# Patient Record
Sex: FEMALE | Race: WHITE | NOT HISPANIC OR LATINO | Employment: OTHER | URBAN - METROPOLITAN AREA
[De-identification: names, ages, dates, MRNs, and addresses within clinical notes are randomized per-mention and may not be internally consistent; named-entity substitution may affect disease eponyms.]

---

## 2017-03-16 ENCOUNTER — HOSPITAL ENCOUNTER (OUTPATIENT)
Dept: RADIOLOGY | Facility: MEDICAL CENTER | Age: 62
End: 2017-03-16
Attending: FAMILY MEDICINE
Payer: COMMERCIAL

## 2017-03-16 ENCOUNTER — OFFICE VISIT (OUTPATIENT)
Dept: MEDICAL GROUP | Age: 62
End: 2017-03-16
Payer: COMMERCIAL

## 2017-03-16 ENCOUNTER — TELEPHONE (OUTPATIENT)
Dept: MEDICAL GROUP | Age: 62
End: 2017-03-16

## 2017-03-16 VITALS
OXYGEN SATURATION: 100 % | WEIGHT: 154 LBS | SYSTOLIC BLOOD PRESSURE: 116 MMHG | DIASTOLIC BLOOD PRESSURE: 62 MMHG | TEMPERATURE: 98.7 F | HEART RATE: 107 BPM | BODY MASS INDEX: 28.34 KG/M2 | HEIGHT: 62 IN

## 2017-03-16 DIAGNOSIS — S49.91XA RIGHT SHOULDER INJURY, INITIAL ENCOUNTER: ICD-10-CM

## 2017-03-16 DIAGNOSIS — R06.83 SNORING: ICD-10-CM

## 2017-03-16 DIAGNOSIS — R09.82 POSTNASAL DRIP: ICD-10-CM

## 2017-03-16 DIAGNOSIS — R06.2 WHEEZING: ICD-10-CM

## 2017-03-16 DIAGNOSIS — R05.9 COUGH: ICD-10-CM

## 2017-03-16 PROCEDURE — 73030 X-RAY EXAM OF SHOULDER: CPT | Mod: RT

## 2017-03-16 PROCEDURE — 99214 OFFICE O/P EST MOD 30 MIN: CPT | Performed by: FAMILY MEDICINE

## 2017-03-16 RX ORDER — FLUTICASONE PROPIONATE 50 MCG
2 SPRAY, SUSPENSION (ML) NASAL DAILY
Qty: 16 G | Refills: 3 | Status: SHIPPED | OUTPATIENT
Start: 2017-03-16 | End: 2017-09-19

## 2017-03-16 RX ORDER — ALBUTEROL SULFATE 90 UG/1
2 AEROSOL, METERED RESPIRATORY (INHALATION) EVERY 6 HOURS PRN
Qty: 8.5 G | Refills: 1 | Status: SHIPPED | OUTPATIENT
Start: 2017-03-16 | End: 2017-09-19

## 2017-03-16 ASSESSMENT — PATIENT HEALTH QUESTIONNAIRE - PHQ9: CLINICAL INTERPRETATION OF PHQ2 SCORE: 0

## 2017-03-16 NOTE — MR AVS SNAPSHOT
"Cindi Cooper   3/16/2017 8:20 AM   Office Visit   MRN: [id number]    Department:  [address] [place] [address]   Dept Phone:  [phone]    Description:  Female : 1955   Provider:  Haley Jin M.D.           Reason for Visit     Shoulder Injury           Allergies as of 3/16/2017     No Known Allergies      You were diagnosed with     Right shoulder injury, initial encounter   [813619]       Cough   [786.2.ICD-9-CM]       Postnasal drip   [784.91.ICD-9-CM]       Wheezing   [786.07.ICD-9-CM]         Vital Signs     Blood Pressure Pulse Temperature Height Weight Body Mass Index    116/62 mmHg 107 37.1 °C (98.7 °F) 1.575 m (5' 2\") 69.854 kg (154 lb) 28.16 kg/m2    Oxygen Saturation Smoking Status                100% Former Smoker          Basic Information     Date Of Birth Sex Race Ethnicity Preferred Language    1955 Female White Non- English      Your appointments     May 03, 2017  1:00 PM   Established Patient with Haley Jin M.D.   [address] [id number] [phone]           You will be receiving a confirmation call a few days before your appointment from our automated call confirmation system.              Problem List              ICD-10-CM Priority Class Noted - Resolved    Hypercholesteremia E78.00   2015 - Present    Vitamin D insufficiency E55.9   3/30/2016 - Present      Health Maintenance        Date Due Completion Dates    IMM DTaP/Tdap/Td Vaccine (1 - Tdap) 3/7/1976 ---    COLONOSCOPY 3/20/2014 3/20/2009    IMM INFLUENZA (1) 2016 ---    IMM ZOSTER VACCINE 3/7/2017 ---    MAMMOGRAM 2017, 2015, 2014, 2013, 2012, 2011, 3/15/2010, 3/15/2010, 2008, 2008, 10/31/2007, 10/31/2007, 10/24/2006, 10/24/2006, 2005, 2004    PAP SMEAR 2018            Current Immunizations     No immunizations on file.      Below and/or attached are the " medications your provider expects you to take. Review all of your home medications and newly ordered medications with your provider and/or pharmacist. Follow medication instructions as directed by your provider and/or pharmacist. Please keep your medication list with you and share with your provider. Update the information when medications are discontinued, doses are changed, or new medications (including over-the-counter products) are added; and carry medication information at all times in the event of emergency situations     Allergies:  No Known Allergies          Medications  Valid as of: March 16, 2017 -  9:19 AM    Generic Name Brand Name Tablet Size Instructions for use    Albuterol Sulfate (Aero Soln) albuterol 108 (90 BASE) MCG/ACT Inhale 2 Puffs by mouth every 6 hours as needed for Shortness of Breath.        Fluticasone Propionate (Suspension) FLONASE 50 MCG/ACT Spray 2 Sprays in nose every day.        Simvastatin (Tab) ZOCOR 20 MG TAKE 1 TAB BY MOUTH EVERY EVENING.        .                 Medicines prescribed today were sent to:     [facility]/PHARMACY #[id number] - [place] - [address]    [address] [place] [place] [address]    Phone: [phone] Fax: [phone]    Open 24 Hours?: No      Medication refill instructions:       If your prescription bottle indicates you have medication refills left, it is not necessary to call your provider’s office. Please contact your pharmacy and they will refill your medication.    If your prescription bottle indicates you do not have any refills left, you may request refills at any time through one of the following ways: The online Crowdery system (except Urgent Care), by calling your provider’s office, or by asking your pharmacy to contact your provider’s office with a refill request. Medication refills are processed only during regular business hours and may not be available until the next business day. Your provider may request additional information or to have a  follow-up visit with you prior to refilling your medication.   *Please Note: Medication refills are assigned a new Rx number when refilled electronically. Your pharmacy may indicate that no refills were authorized even though a new prescription for the same medication is available at the pharmacy. Please request the medicine by name with the pharmacy before contacting your provider for a refill.        Your To Do List     Future Labs/Procedures Complete By Expires    DX-SHOULDER 2+ RIGHT  As directed 9/13/2017         [email or web address] Access Code: [email or web address]  Expires: 4/15/2017  8:16 AM    Reval.com  A secure, online tool to manage your health information     [employer]’s Reval.com® is a secure, online tool that connects you to your personalized health information from the privacy of your home -- day or night - making it very easy for you to manage your healthcare. Once the activation process is completed, you can even access your medical information using the Reval.com dahlia, which is available for free in the Apple Dahlia store or Google Play store.     Reval.com provides the following levels of access (as shown below):   My Chart Features   [facility] Primary [facility] Doctor [facility]  Specialists [facility]  Urgent  Care Non-[facility]  Primary Care  Doctor   Email your healthcare team securely and privately 24/7 X X X    Manage appointments: schedule your next appointment; view details of past/upcoming appointments X      Request prescription refills. X      View recent personal medical records, including lab and immunizations X X X X   View health record, including health history, allergies, medications X X X X   Read reports about your outpatient visits, procedures, consult and ER notes X X X X   See your discharge summary, which is a recap of your hospital and/or ER visit that includes your diagnosis, lab results, and care plan. X X       How to register for [email or web address].com:  1. Go to  [email or web address].  2. Click on the Sign Up Now box,  which takes you to the New Member Sign Up page. You will need to provide the following information:  a. Enter your Groov[employer]ds Access Code exactly as it appears at the top of this page. (You will not need to use this code after you’ve completed the sign-up process. If you do not sign up before the expiration date, you must request a new code.)   b. Enter your date of birth.   c. Enter your home email address.   d. Click Submit, and follow the next screen’s instructions.  3. Create a GroovinAds ID. This will be your GroovinAds login ID and cannot be changed, so think of one that is secure and easy to remember.  4. Create a Groov[employer]Ads password. You can change your password at any time.  5. Enter your Password Reset Question and Answer. This can be used at a later time if you forget your password.   6. Enter your e-mail address. This allows you to receive e-mail notifications when new information is available in GroovinAds.  7. Click Sign Up. You can now view your health information.    For assistance activating your GroovinAds account, call [phone]

## 2017-03-16 NOTE — PROGRESS NOTES
"SUBJECTIVE:        Chief Complaint   Patient presents with   • Shoulder Injury       HPI:     Cnidi Cooper is a 62 y.o. female here for shoulder pain on right after having a fall. Right handed.   Dec 2016, was jogging and tripped and fell hard on right side of her right arm. Uncertain how she fell exactly as it happened so fast, but possibly landed on her forearm. Hurts mostly at night time in her rights shoulder region. If moves a certain way it hurts. Avoiding weights now.   Right anterior shoulder hurts mostly. At rest hurts- annoying pain, 6/10. Achy, sharp and stabbing pain.   Ibuprofen has not helped much.  Has not taken much medication. Does not radiate. No neck pain.   Works as , at desk and computer. No numbness, tingling or weakness.     Dry cough last few day, has had some postnasal drainage. No significant congestion. No fevers/chills.     Has had holter monitor previously low HR. Denies any symptoms of dizziness and light headedness.   Does snore at night. Feels exhausted when gets home from work lately.    ROS:  Denies any recent fevers or chills. No nausea or vomiting. No diarrhea. No chest pains or shortness of breath. No lower extremity edema.    Current Outpatient Prescriptions on File Prior to Visit   Medication Sig Dispense Refill   • simvastatin (ZOCOR) 20 MG Tab TAKE 1 TAB BY MOUTH EVERY EVENING. 90 Tab 2     No current facility-administered medications on file prior to visit.       No Known Allergies    Past Medical History   Diagnosis Date   • Hyperlipidemia        OBJECTIVE:   /62 mmHg  Pulse 107  Temp(Src) 37.1 °C (98.7 °F)  Ht 1.575 m (5' 2\")  Wt 69.854 kg (154 lb)  BMI 28.16 kg/m2  SpO2 100%  General: Well-developed well-nourished female, no acute distress  HEENT: oropharynx clear, eyes clear, TMs clear and intact  Neck: supple, no lymphadenopathy- cervical or supraclavicular, no thyromegaly, nasal passages with mild edema  Cardiovascular: regular rate and " rhythm, no murmurs, gallops, rubs  Lungs: mild wheezes throughout,  no crackles, or rhonchi  Abdomen: +bowel sounds, soft, nontender, nondistended, no rebound, no guarding, no hepatosplenomegaly  Extremities: no cyanosis, clubbing, edema  Neck exam: No spinal tenderness to palpation. Full ROM.   Shoulder/arm exam: No erythema/edema/ecchymosis. Tenderness to palpation - right anterior shoulder with TTP and along biceps groove. Acromioclavicular joint tenderness to palpation: negative, slight step off of right AC joint region compared to left.  ROM- forward flexion R 165 deg L 165 deg, glenohumeral joint R 90 deg  L 90 deg, external rotation R 70 deg L 70 deg, internal rotation R thumb L 4.  Hawkin's test negative. Neer's test-negative. Weatherford's test + on right. Yergason's test negative. Speed's test  + on right. Cross arm test negative. Empty can test - mild discomfort with resistance. Drop arm test negative. Lift off - mild discomfort with resistance. 5/5 strength bilaterally.   Elbow/forearm: Tenderness to palpation: negative. Full ROM negative.  5/5 strength throughout bilaterally. 2+ DTR biceps and triceps bilaterally.   Skin: Warm and dry  Psych: appropriate mood and affect      ASSESSMENT/PLAN:    62 year old female.     1. Right shoulder injury, initial encounter- slight irregularly of right AC joint, nontender. Suspect possible labral tear, some component of rotator cuff and biceps tendon involvement. Suspect will need MRI-arthrogram for further evaluation.   - Modify activities. Declined medication therapy. Discussed possible physical therapy and alternative options.   DX-SHOULDER 2+ RIGHT   2. Cough- appears multifactorial.   fluticasone (FLONASE) 50 MCG/ACT nasal spray   3. Postnasal drip  fluticasone (FLONASE) 50 MCG/ACT nasal spray   4. Wheezing  Albuterol HFA   5. Snoring- hx of bradycardia, asymptomatic. Obtain overnight oximetry test.        Return in about 1 month (around  4/16/2017).

## 2017-03-16 NOTE — TELEPHONE ENCOUNTER
Please let pt know her shoulder xray was normal.   I will put in order for MRI as discussed. She will likely need lab work prior.

## 2017-03-17 NOTE — TELEPHONE ENCOUNTER
Phone Number Called: [phone]    Message: Spoke with pt, informed her of Dr. Jin's message as stated below, she acknowledged.    Left Message for patient to call back: N\A

## 2017-03-20 ENCOUNTER — TELEPHONE (OUTPATIENT)
Dept: MEDICAL GROUP | Age: 62
End: 2017-03-20

## 2017-03-20 NOTE — TELEPHONE ENCOUNTER
----- Message from Haley Jin M.D. sent at 3/20/2017  1:52 PM PDT -----  Please let patient know recent shoulder xray was normal.

## 2017-03-20 NOTE — TELEPHONE ENCOUNTER
Phone Number Called: [phone] (home)     Message: Left VM for patient to call back    Left Message for patient to call back: yes

## 2017-03-27 ENCOUNTER — TELEPHONE (OUTPATIENT)
Dept: MEDICAL GROUP | Age: 62
End: 2017-03-27

## 2017-03-27 NOTE — TELEPHONE ENCOUNTER
Imaging called stating that an MRI- Shoulder was cancelled by the radiologist because the pt needs an Arthrogram test done instead. They need this ordered to be placed so that they can reschedule the patient. Dr. Jin is out of the office until Wednesday.

## 2017-04-19 ENCOUNTER — HOSPITAL ENCOUNTER (OUTPATIENT)
Dept: RADIOLOGY | Facility: MEDICAL CENTER | Age: 62
End: 2017-04-19
Attending: FAMILY MEDICINE
Payer: COMMERCIAL

## 2017-04-19 DIAGNOSIS — S49.91XA RIGHT SHOULDER INJURY, INITIAL ENCOUNTER: ICD-10-CM

## 2017-04-19 PROCEDURE — 73222 MRI JOINT UPR EXTREM W/DYE: CPT | Mod: RT

## 2017-04-19 PROCEDURE — 700101 HCHG RX REV CODE 250

## 2017-04-19 PROCEDURE — 23350 INJECTION FOR SHOULDER X-RAY: CPT | Mod: RT

## 2017-04-19 PROCEDURE — A9579 GAD-BASE MR CONTRAST NOS,1ML: HCPCS | Performed by: FAMILY MEDICINE

## 2017-04-19 PROCEDURE — 700117 HCHG RX CONTRAST REV CODE 255: Performed by: FAMILY MEDICINE

## 2017-04-19 RX ORDER — LIDOCAINE HYDROCHLORIDE 10 MG/ML
INJECTION, SOLUTION INFILTRATION; PERINEURAL
Status: DISPENSED
Start: 2017-04-19 | End: 2017-04-20

## 2017-04-19 RX ADMIN — IOHEXOL 50 ML: 300 INJECTION, SOLUTION INTRAVENOUS at 13:35

## 2017-04-19 RX ADMIN — GADODIAMIDE 5 ML: 287 INJECTION INTRAVENOUS at 13:35

## 2017-04-19 NOTE — OR SURGEON
Immediate Post- Operative Note        PostOp Diagnosis: right shoulder pain      Procedure(s): right shoulder injection      Estimated Blood Loss: Less than 5 ml        Complications: None            4/19/2017     2:36 PM     Robb Montero

## 2017-05-03 ENCOUNTER — OFFICE VISIT (OUTPATIENT)
Dept: MEDICAL GROUP | Age: 62
End: 2017-05-03
Payer: COMMERCIAL

## 2017-05-03 VITALS
HEART RATE: 83 BPM | TEMPERATURE: 98.1 F | DIASTOLIC BLOOD PRESSURE: 78 MMHG | HEIGHT: 62 IN | SYSTOLIC BLOOD PRESSURE: 102 MMHG | WEIGHT: 154.6 LBS | OXYGEN SATURATION: 100 % | BODY MASS INDEX: 28.45 KG/M2

## 2017-05-03 DIAGNOSIS — R06.83 SNORING: ICD-10-CM

## 2017-05-03 DIAGNOSIS — M75.101 TEAR OF RIGHT ROTATOR CUFF, UNSPECIFIED TEAR EXTENT: ICD-10-CM

## 2017-05-03 DIAGNOSIS — Z12.11 SCREEN FOR COLON CANCER: ICD-10-CM

## 2017-05-03 PROCEDURE — 99214 OFFICE O/P EST MOD 30 MIN: CPT | Performed by: FAMILY MEDICINE

## 2017-05-03 NOTE — MR AVS SNAPSHOT
"        Cindi Cooper   5/3/2017 1:00 PM   Office Visit   MRN: [id number]    Department:  [address] [place] [address]   Dept Phone:  [phone]    Description:  Female : 1955   Provider:  Haley Jin M.D.           Reason for Visit     Shoulder Injury Right shoulder. Xray and MRI done      Allergies as of 5/3/2017     No Known Allergies      You were diagnosed with     Partial tear of right rotator cuff   [8984700]       Screen for colon cancer   [985071]         Vital Signs     Blood Pressure Pulse Temperature Height Weight Body Mass Index    102/78 mmHg 83 36.7 °C (98.1 °F) 1.575 m (5' 2.01\") 70.126 kg (154 lb 9.6 oz) 28.27 kg/m2    Oxygen Saturation Smoking Status                100% Former Smoker          Basic Information     Date Of Birth Sex Race Ethnicity Preferred Language    1955 Female White Non- English      Problem List              ICD-10-CM Priority Class Noted - Resolved    Hypercholesteremia E78.00   2015 - Present    Vitamin D insufficiency E55.9   3/30/2016 - Present      Health Maintenance        Date Due Completion Dates    IMM DTaP/Tdap/Td Vaccine (1 - Tdap) 3/7/1976 ---    COLONOSCOPY 3/20/2014 3/20/2009    IMM ZOSTER VACCINE 3/7/2017 ---    MAMMOGRAM 2017, 2015, 2014, 2013, 2012, 2011, 3/15/2010, 3/15/2010, 2008, 2008, 10/31/2007, 10/31/2007, 10/24/2006, 10/24/2006, 2005, 2004    PAP SMEAR 2018            Current Immunizations     No immunizations on file.      Below and/or attached are the medications your provider expects you to take. Review all of your home medications and newly ordered medications with your provider and/or pharmacist. Follow medication instructions as directed by your provider and/or pharmacist. Please keep your medication list with you and share with your provider. Update the information when medications are discontinued, doses are changed, or new medications (including " over-the-counter products) are added; and carry medication information at all times in the event of emergency situations     Allergies:  No Known Allergies          Medications  Valid as of: May 03, 2017 -  1:51 PM    Generic Name Brand Name Tablet Size Instructions for use    Albuterol Sulfate (Aero Soln) albuterol 108 (90 BASE) MCG/ACT Inhale 2 Puffs by mouth every 6 hours as needed for Shortness of Breath.        Fluticasone Propionate (Suspension) FLONASE 50 MCG/ACT Spray 2 Sprays in nose every day.        Simvastatin (Tab) ZOCOR 20 MG TAKE 1 TAB BY MOUTH EVERY EVENING.        .                 Medicines prescribed today were sent to:     [facility]/PHARMACY #[id number] - [place], [place] - [address]    [address] [place] [address]    Phone: [phone] Fax: [phone]    Open 24 Hours?: No      Medication refill instructions:       If your prescription bottle indicates you have medication refills left, it is not necessary to call your provider’s office. Please contact your pharmacy and they will refill your medication.    If your prescription bottle indicates you do not have any refills left, you may request refills at any time through one of the following ways: The online ReviewZAP system (except Urgent Care), by calling your provider’s office, or by asking your pharmacy to contact your provider’s office with a refill request. Medication refills are processed only during regular business hours and may not be available until the next business day. Your provider may request additional information or to have a follow-up visit with you prior to refilling your medication.   *Please Note: Medication refills are assigned a new Rx number when refilled electronically. Your pharmacy may indicate that no refills were authorized even though a new prescription for the same medication is available at the pharmacy. Please request the medicine by name with the pharmacy before contacting your provider for a refill.           Referral     A referral request has been sent to our patient care coordination department. Please allow 3-5 business days for us to process this request and contact you either by phone or mail. If you do not hear from us by the 5th business day, please call us at [phone].           [employer] Access Code: Activation code not generated  Current M[employer]Point Status: Active

## 2017-05-03 NOTE — PROGRESS NOTES
SUBJECTIVE:        Chief Complaint   Patient presents with   • Shoulder Injury     Right shoulder. Xray and MRI done       HPI:     Cindi Cooper is a 62 y.o. female here for follow up of right shoulder pain and imaging with MRI.   Right shoulder pain- since she fell on her right shoulder in December. Continues to have pain in the area. Has gotten worse since then.     Snoring- has not obtained overnight oximetry test yet. Did receive a call from [address].     Prior symptoms of cough, postnasal drip and wheezing improved after last visit.         Narrative        4/19/2017 1:18 PM    HISTORY/REASON FOR EXAM: Right shoulder pain. Ground-level fall.      TECHNIQUE/EXAM DESCRIPTION:  MRI shoulder RIGHT arthrogram.    Using a GE 1.5 Linda MRI scanner, T1 fat-suppressed oblique coronal, sagittal, axial and abduction external rotation views were obtained. Fast spin-echo T2 fat-suppressed oblique coronal, axial, and sagittal images were also obtained. These images were   obtained following the intra-articular administration of dilute Omniscan.    COMPARISON:  None.    FINDINGS:  Osseous acromial outlet: The acromion demonstrates a flat undersurface.  There is mild lateral downsloping.  There is a large inferiorly directed subacromial enthesophyte.  There is mild to moderate degenerative change of the acromioclavicular joint. There are no inferiorly directed osteophytes.  There is contrast signal fluid seen within the subacromial/subdeltoid bursa.    Rotator cuff: There is a full-thickness tear involving the leading edge of the supraspinatus tendon which measures 10 mm in AP dimension and results in minimal retraction. There is partial-thickness tear involving the posterior articular surface fibers.   There is tendinopathy involving the supraspinatus and infraspinatus tendons.    Glenoid labrum: The superior glenoid labrum is intact. The anterior/inferior and posterior/inferior glenoid labrum are intact. There is no  evidence of retraction of the anterior/inferior glenoid labrum on abduction external rotation images.    Osseous structures/Cartilaginous surfaces: There is no evidence of marrow edema or evidence of fracture. Cartilaginous surfaces are well-maintained. There is no evidence of a Hill--Sachs type of deformity.    Miscellaneous: No evidence of intra-articular loose body. The long head of the biceps tendon is appropriately situated within the bicipital groove.         Impression        1.  Full-thickness tear involving the leading edge of the supraspinatus tendon which measures 10 mm in AP dimension and results in minimal retraction. Partial-thickness tear involves the posterior articular surface fibers.    2.  Tendinopathy of the supraspinatus and infraspinatus tendons.    3.  Lateral downsloping of the acromion with a large inferiorly directed subacromial enthesophyte.    4.  Degenerative change of the acromioclavicular joint.    5.  Intact glenoid labrum.     Narrative        3/16/2017 9:32 AM    HISTORY/REASON FOR EXAM:  Fall onto right shoulder and right shoulder pain      TECHNIQUE/EXAM DESCRIPTION AND NUMBER OF VIEWS:  3 views of the RIGHT shoulder.    COMPARISON: None    FINDINGS:  Bone mineralization is normal.  There is no evidence of acute fracture.  There is no evidence of dislocation.  There is no evidence of arthropathy.  No abnormalities are identified in the soft tissues.         Impression        There is no evidence of acute fracture.       ROS:  Denies any recent fevers or chills. No nausea or vomiting. No diarrhea. No chest pains or shortness of breath. No lower extremity edema.    Current Outpatient Prescriptions on File Prior to Visit   Medication Sig Dispense Refill   • fluticasone (FLONASE) 50 MCG/ACT nasal spray Spray 2 Sprays in nose every day. 16 g 3   • simvastatin (ZOCOR) 20 MG Tab TAKE 1 TAB BY MOUTH EVERY EVENING. 90 Tab 2   • albuterol 108 (90 BASE) MCG/ACT Aero Soln inhalation aerosol  "Inhale 2 Puffs by mouth every 6 hours as needed for Shortness of Breath. (Patient not taking: Reported on 5/3/2017) 8.5 g 1     No current facility-administered medications on file prior to visit.       No Known Allergies    Past Medical History   Diagnosis Date   • Hyperlipidemia        OBJECTIVE:   /78 mmHg  Pulse 83  Temp(Src) 36.7 °C (98.1 °F)  Ht 1.575 m (5' 2.01\")  Wt 70.126 kg (154 lb 9.6 oz)  BMI 28.27 kg/m2  SpO2 100%  General: Well-developed well-nourished female, no acute distress  Oropharynx: large soft palate and small posterior oropharynx opening  Extremities: no cyanosis, clubbing, edema  Skin: Warm and dry  Psych: appropriate mood and affect      ASSESSMENT/PLAN:    62 year old female.     1. Tear of right rotator cuff, unspecified tear extent- full thickness and partial thickness tear noted.   Reviewed findings with patient.   -Referral to orthopedics completed. Reviewed possible options of therapy. Advised to modify activities.  REFERRAL TO ORTHOPEDICS   2. Snoring- patient encouraged complete overnight oximetry test.     3. Screen for colon cancer  REFERRAL TO GASTROENTEROLOGY         Return if symptoms worsen or fail to improve.          > 25 minutes spent with this patient of which > 15 minutes spent on counseling and coordination of care.                     "

## 2017-05-10 RX ORDER — SIMVASTATIN 20 MG
TABLET ORAL
Qty: 90 TAB | Refills: 0 | Status: SHIPPED | OUTPATIENT
Start: 2017-05-10 | End: 2017-08-19 | Stop reason: SDUPTHER

## 2017-05-23 NOTE — TELEPHONE ENCOUNTER
Phone Number Called: [phone] (home)     Message: Left VM for patient to call us back regarding results    Left Message for patient to call back: yes         left lower quadrant/right upper quadrant/left upper quadrant/right lower quadrant

## 2017-08-02 ENCOUNTER — HOSPITAL ENCOUNTER (OUTPATIENT)
Dept: RADIOLOGY | Facility: MEDICAL CENTER | Age: 62
End: 2017-08-02
Attending: OBSTETRICS & GYNECOLOGY
Payer: COMMERCIAL

## 2017-08-02 DIAGNOSIS — Z12.31 SCREENING MAMMOGRAM, ENCOUNTER FOR: ICD-10-CM

## 2017-08-02 PROCEDURE — 77063 BREAST TOMOSYNTHESIS BI: CPT

## 2017-08-21 RX ORDER — SIMVASTATIN 20 MG
TABLET ORAL
Qty: 90 TAB | Refills: 0 | Status: SHIPPED | OUTPATIENT
Start: 2017-08-21 | End: 2018-02-22 | Stop reason: SDUPTHER

## 2017-09-19 ENCOUNTER — APPOINTMENT (OUTPATIENT)
Dept: ADMISSIONS | Facility: MEDICAL CENTER | Age: 62
End: 2017-09-19
Payer: COMMERCIAL

## 2017-09-19 DIAGNOSIS — Z01.810 PRE-OPERATIVE CARDIOVASCULAR EXAMINATION: ICD-10-CM

## 2017-09-19 LAB — EKG IMPRESSION: NORMAL

## 2017-09-19 PROCEDURE — 93005 ELECTROCARDIOGRAM TRACING: CPT

## 2017-09-19 PROCEDURE — 93010 ELECTROCARDIOGRAM REPORT: CPT | Performed by: INTERNAL MEDICINE

## 2017-10-05 ENCOUNTER — HOSPITAL ENCOUNTER (OUTPATIENT)
Facility: MEDICAL CENTER | Age: 62
End: 2017-10-05
Attending: ORTHOPAEDIC SURGERY | Admitting: ORTHOPAEDIC SURGERY
Payer: COMMERCIAL

## 2017-10-05 VITALS
OXYGEN SATURATION: 94 % | BODY MASS INDEX: 29.18 KG/M2 | SYSTOLIC BLOOD PRESSURE: 154 MMHG | RESPIRATION RATE: 16 BRPM | TEMPERATURE: 97 F | WEIGHT: 154.54 LBS | DIASTOLIC BLOOD PRESSURE: 73 MMHG | HEIGHT: 61 IN

## 2017-10-05 PROBLEM — M75.121 COMPLETE TEAR OF RIGHT ROTATOR CUFF: Status: ACTIVE | Noted: 2017-10-05

## 2017-10-05 PROCEDURE — 160035 HCHG PACU - 1ST 60 MINS PHASE I: Performed by: ORTHOPAEDIC SURGERY

## 2017-10-05 PROCEDURE — 502240 HCHG MISC OR SUPPLY RC 0272: Performed by: ORTHOPAEDIC SURGERY

## 2017-10-05 PROCEDURE — A6222 GAUZE <=16 IN NO W/SAL W/O B: HCPCS | Performed by: ORTHOPAEDIC SURGERY

## 2017-10-05 PROCEDURE — 160009 HCHG ANES TIME/MIN: Performed by: ORTHOPAEDIC SURGERY

## 2017-10-05 PROCEDURE — 160048 HCHG OR STATISTICAL LEVEL 1-5: Performed by: ORTHOPAEDIC SURGERY

## 2017-10-05 PROCEDURE — 160029 HCHG SURGERY MINUTES - 1ST 30 MINS LEVEL 4: Performed by: ORTHOPAEDIC SURGERY

## 2017-10-05 PROCEDURE — 160046 HCHG PACU - 1ST 60 MINS PHASE II: Performed by: ORTHOPAEDIC SURGERY

## 2017-10-05 PROCEDURE — 160036 HCHG PACU - EA ADDL 30 MINS PHASE I: Performed by: ORTHOPAEDIC SURGERY

## 2017-10-05 PROCEDURE — 502581 HCHG PACK, SHOULDER ARTHROSCOPY: Performed by: ORTHOPAEDIC SURGERY

## 2017-10-05 PROCEDURE — 500423 HCHG DRESSING, ABD COMBINE: Performed by: ORTHOPAEDIC SURGERY

## 2017-10-05 PROCEDURE — 160022 HCHG BLOCK: Performed by: ORTHOPAEDIC SURGERY

## 2017-10-05 PROCEDURE — 160041 HCHG SURGERY MINUTES - EA ADDL 1 MIN LEVEL 4: Performed by: ORTHOPAEDIC SURGERY

## 2017-10-05 PROCEDURE — 500759 HCHG KIT, BEACH CHAIR POSITIONER: Performed by: ORTHOPAEDIC SURGERY

## 2017-10-05 PROCEDURE — 160047 HCHG PACU  - EA ADDL 30 MINS PHASE II: Performed by: ORTHOPAEDIC SURGERY

## 2017-10-05 PROCEDURE — 501838 HCHG SUTURE GENERAL: Performed by: ORTHOPAEDIC SURGERY

## 2017-10-05 PROCEDURE — L3670 SO ACRO/CLAV CAN WEB PRE OTS: HCPCS | Performed by: ORTHOPAEDIC SURGERY

## 2017-10-05 PROCEDURE — 160002 HCHG RECOVERY MINUTES (STAT): Performed by: ORTHOPAEDIC SURGERY

## 2017-10-05 PROCEDURE — 501336 HCHG SHAVER: Performed by: ORTHOPAEDIC SURGERY

## 2017-10-05 PROCEDURE — 700101 HCHG RX REV CODE 250

## 2017-10-05 PROCEDURE — 160025 RECOVERY II MINUTES (STATS): Performed by: ORTHOPAEDIC SURGERY

## 2017-10-05 PROCEDURE — 700111 HCHG RX REV CODE 636 W/ 250 OVERRIDE (IP)

## 2017-10-05 PROCEDURE — 502000 HCHG MISC OR IMPLANTS RC 0278: Performed by: ORTHOPAEDIC SURGERY

## 2017-10-05 PROCEDURE — 700105 HCHG RX REV CODE 258: Performed by: ORTHOPAEDIC SURGERY

## 2017-10-05 DEVICE — ANCHOR SUTURE ICONIX 3 WITH 3 STRANDS #2 FORCE FIBER 2.3MM (5EA/BX): Type: IMPLANTABLE DEVICE | Status: FUNCTIONAL

## 2017-10-05 RX ORDER — EPINEPHRINE 1 MG/ML(1)
AMPUL (ML) INJECTION
Status: DISCONTINUED | OUTPATIENT
Start: 2017-10-05 | End: 2017-10-05 | Stop reason: HOSPADM

## 2017-10-05 RX ORDER — SODIUM CHLORIDE, SODIUM LACTATE, POTASSIUM CHLORIDE, CALCIUM CHLORIDE 600; 310; 30; 20 MG/100ML; MG/100ML; MG/100ML; MG/100ML
INJECTION, SOLUTION INTRAVENOUS
Status: DISCONTINUED | OUTPATIENT
Start: 2017-10-05 | End: 2017-10-05 | Stop reason: HOSPADM

## 2017-10-05 RX ADMIN — SODIUM CHLORIDE, POTASSIUM CHLORIDE, SODIUM LACTATE AND CALCIUM CHLORIDE: 600; 310; 30; 20 INJECTION, SOLUTION INTRAVENOUS at 12:30

## 2017-10-05 ASSESSMENT — PAIN SCALES - GENERAL
PAINLEVEL_OUTOF10: 0
PAINLEVEL_OUTOF10: 5

## 2017-10-05 NOTE — OR NURSING
1655  Pt to stage two via ramona. Pt denies pain and nausea at this time. Pt getting dressed with help of CNA. Positive radial pulse and < 3 second cap refill to RUE.   1700 Pt up to chair with help of CNA. VSS.   1725 Explained discharge instructions to pt and pts  . Both express understanding   1730 Pt meets criteria to be discharged after uneventful stay in stage two

## 2017-10-05 NOTE — OR NURSING
Respirations easy in PACU. Dressing clean and dry.  Fingers warm with brisk cap refill, patient can't wiggle the, (had a block). Ice pack in place.  Report to PACU 2 and patient ready for transfer.

## 2017-10-05 NOTE — OR SURGEON
Operative Report    PreOp Diagnosis: RIGHT shoulder impingement, labral fraying, RCT    PostOp Diagnosis: SAME    Procedure(s):  RIGHT  SHOULDER DECOMPRESSION ARTHROSCOPIC SUBACROMIAL - Wound Class: Clean  ARTHROSCOPIC LABRAL DEBRIDEMENT - Wound Class: Clean  SHOULDER ARTHROSCOPY W/ ROTATOR CUFF REPAIR - Wound Class: Clean    Surgeon(s):  Mimi Wood M.D.    Anesthesiologist/Type of Anesthesia:  Anesthesiologist: Rhett Cary M.D./General    Surgical Staff:  Assistant: [email or web address]  Circulator: Jaja Snyder R.N.  Scrub Person: Carmen Mazariegos    Specimens:  * No specimens in log *    Estimated Blood Loss: min    Findings: as above    Complications: none    Marleni        10/5/2017 3:31 PM Mimi Wood

## 2017-10-05 NOTE — OP REPORT
DATE OF SERVICE:  10/05/2017    PREOPERATIVE DIAGNOSES:  Right shoulder impingement syndrome, rotator cuff   tear, labral fraying.    POSTOPERATIVE DIAGNOSES:  Right shoulder impingement syndrome, rotator cuff   tear, labral fraying.    PROCEDURE PERFORMED:  Right shoulder arthroscopy, subacromial decompression,   labral debridement, arthroscopic rotator cuff repair.    SURGEON:  Mimi Wood MD    ASSISTANT:  Pantera Cao CFA    ANESTHESIOLOGIST:  Rhett Cary MD    TYPE OF ANESTHESIA:  General, with preoperative interscalene nerve block.    INTRAVENOUS FLUID:  1 liter crystalloid.    ESTIMATED BLOOD LOSS:  Minimal.    DRAINS:  None.    SPECIMENS:  None.    COMPLICATIONS:  None.    IMPLANTS:  [place] ICONIX anchors x2.    REASON FOR PROCEDURE:  The patient is a 62-year-old female with a longstanding   history of right shoulder pain.  She tripped and fell last year while   running.  She continued to have pain.  An MRI demonstrated a rotator cuff   tear.  We decided to proceed with arthroscopy.    DESCRIPTION OF PROCEDURE:  The patient was given a right interscalene nerve   block by the anesthesiologist before surgery.  Once back in the operating   room, a breathing tube was placed.  She was given 2 g of IV Ancef.  She was   placed in the lateral decubitus position.  Care was taken to pad her axilla as   well as all bony prominences.  The right shoulder was then examined under   anesthesia.  She was noted to have good range of motion, without instability.    The right upper extremity was prepped with ChloraPrep and draped in standard   sterile fashion.  It was placed in the traction device.  Bony landmarks were   drawn and a standard posterior portal was established.  The arthroscope was   then inserted.  An anterosuperior cannula was placed in the rotator interval,   just beneath the biceps tendon.  A probe was inserted.  There was slight   fraying to the long head of biceps attachment, but no instability and  the   tendon itself appeared normal throughout its intra-articular course.  The   superior labrum was debrided.  The cartilage surfaces of the humeral head and   glenoid were normal.  The subscapularis tendon was normal.  There was a full   thickness rotator cuff tear superiorly that was visualized and debrided from   its undersurface.  The arthroscope was then placed into the subacromial space.    A lateral portal was established.  There was a copious amount of thickened   and inflamed bursal tissue.  This was removed with the 4-0 aggressive shaver.    The borders of the acromion were defined.  The 5.5 mm resector was used to   remove the anterior curve as well as lateral edge.  Portals were switched.    Using a standard cutting block technique from posterior to anterior, a   subacromial decompression was completed.  Next, attention was turned to the   rotator cuff.  There was a tear noted.  This was a medium sized minimally   retracted tear.  The greater tuberosity was then roughened.  This appeared   best amenable to 2 triple-loaded anchors.  The greater tuberosity was abraded   and then fenestrated with the tip of the punch tap.  The 8 mm cannula was   placed laterally with the smaller cannula placed anteriorly.  The first of 2   medial row anchors was drilled and tapped into place.  A simple suture was   passed anteriorly followed by a wide horizontal mattress suture with a simple   suture through the middle.  A second anchor was drilled and tapped into placed   just posterior to the first.  The #2 Force Fiber sutures were then passed in   a similar construct, with a wide horizontal mattress suture with a simple   suture through the middle followed by a simple suture.  The sutures were tied   down from posterior to anterior.  A good repair had been achieved.  A total of   1 liter of saline fluid with bacitracin was then flushed through the   subacromial space and glenohumeral joint.  An excellent repair down to  the   articular margin had been achieved.  All instruments were then removed.    Portals were closed with 3-0 nylon suture.  A sterile dressing was applied.    All drapes were removed and the arm was carefully taken out of traction and   placed into a shoulder abduction sling.  The patient was placed supine on a   stretcher and taken to recovery room, in stable condition.       ____________________________________     MD CHRIS CARBONE / TIAN    DD:  10/05/2017 15:30:07  DT:  10/05/2017 15:44:18    D#:  [id number]  Job#:  [id number]

## 2017-10-05 NOTE — OR NURSING
1210: Brought patient back to pre-op and assumed care.  1250: Patient allergies and NPO status verified, home medication reconciliation completed and belongings secured. Patient verbalizes understanding of pain scale, expected course of stay and plan of care. Surgical site verified with patient. IV access established. Sequentials placed on legs.

## 2017-10-05 NOTE — DISCHARGE INSTRUCTIONS
ACTIVITY: Rest and take it easy for the first 24 hours.  A responsible adult is recommended to remain with you during that time.  It is normal to feel sleepy.  We encourage you to not do anything that requires balance, judgment or coordination.    MILD FLU-LIKE SYMPTOMS ARE NORMAL. YOU MAY EXPERIENCE GENERALIZED MUSCLE ACHES, THROAT IRRITATION, HEADACHE AND/OR SOME NAUSEA.    FOR 24 HOURS DO NOT:  Drive, operate machinery or run household appliances.  Drink beer or alcoholic beverages.   Make important decisions or sign legal documents.    SPECIAL INSTRUCTIONS: *Post-Operative Shoulder Instructions       Dressing and wound care: Keep your shoulder dressing clean and dry after surgery.  Be aware that some leaking of blood or fluid from your dressing can occur and is normal. You may remove your dressing 3 days after the operation.  Notice that you have a single incision and/or several small incisions that have been closed with stitches.  Cover each of these incisions with a light dressing or band-aids.  This keeps the surgical incisions clean, as well as preventing your clothes from spotting with blood or fluid.  Change band-aids or light dressing daily.     Shower / bathing: Keep the shoulder dry for 3 days after your surgery.  Then, you may shower. You may let soap and water run over skin incisions, but do not immerse your shoulder in water.  No swimming pools, hot tubs, or baths are recommended until at least 3 weeks after surgery.     ** If you have had a shoulder replacement, then please wait until your staples are removed at 7-14 days post-op before allowing your incision to get wet.       Ice: Apply an ice pack to your shoulder (15 minutes on the shoulder, 15 minutes off the shoulder), as you feel necessary to help with the pain and swelling.         Sling / Shoulder Immobilizer: The sling should be on at all times, except when bathing and doing your demonstrated exercises.     Activity: It is important  to move your shoulder, as well as your elbow, wrist, and hand several times daily, starting the day after surgery.  You may do pendulum exercises with your operative arm starting the day after surgery.  Pendulum (dangling [place]) exercises are encouraged 2-3 times daily.  The sling will need to be removed for pendulum exercises.     Pain medication: Take your pain medicine, as needed and prescribed.  Do not drive or operate machinery while taking narcotic pain medication.   You may start or resume anti-inflammatory medication (i.e. ibuprofen, naproxen) anytime after surgery, which should be taken with food to avoid stomach irritation.     Problems:     If you are having problems with your shoulder (unexpected pain, excessive bleeding or discharge from your incisions, fevers/chills) do not hesitate to call the office or visit the nearest emergency room for evaluation.     Follow-up:     Make sure that you have an appointment 7-14 days following surgery.  Your stitches will be removed, and your procedure/rehabilitation will be discussed at that time.   Physical therapy may be prescribed at that time. **    DIET: To avoid nausea, slowly advance diet as tolerated, avoiding spicy or greasy foods for the first day.  Add more substantial food to your diet according to your physician's instructions.  Babies can be fed formula or breast milk as soon as they are hungry.  INCREASE FLUIDS AND FIBER TO AVOID CONSTIPATION.        FOLLOW-UP APPOINTMENT:  A follow-up appointment should be arranged with your doctor; *call office if not already scheduled.*    You should CALL YOUR PHYSICIAN if you develop:  Fever greater than 101 degrees F.  Pain not relieved by medication, or persistent nausea or vomiting.  Excessive bleeding (blood soaking through dressing) or unexpected drainage from the wound.  Extreme redness or swelling around the incision site, drainage of pus or foul smelling drainage.  Inability to urinate or empty your  bladder within 8 hours.  Problems with breathing or chest pain.    You should call 911 if you develop problems with breathing or chest pain.  If you are unable to contact your doctor or surgical center, you should go to the nearest emergency room or urgent care center.  Physician's telephone #: *Mimi Wood MD  [facility]  [phone]**    If any questions arise, call your doctor.  If your doctor is not available, please feel free to call the Surgical Center at {Surgical Dept Numbers:[id number]}.  The Center is open Monday through Friday from 7AM to 7PM.  You can also call the HEALTH HOTLINE open 24 hours/day, 7 days/week and speak to a nurse at [phone], or toll free at [phone].    A registered nurse may call you a few days after your surgery to see how you are doing after your procedure.    MEDICATIONS: Resume taking daily medication.  Take prescribed pain medication with food.  If no medication is prescribed, you may take non-aspirin pain medication if needed.  PAIN MEDICATION CAN BE VERY CONSTIPATING.  Take a stool softener or laxative such as senokot, pericolace, or milk of magnesia if needed.    Prescription given for ***.  Last pain medication given at ***.    If your physician has prescribed pain medication that includes Acetaminophen (Tylenol), do not take additional Acetaminophen (Tylenol) while taking the prescribed medication.    Depression / Suicide Risk    As you are discharged from this [facility] facility, it is important to learn how to keep safe from harming yourself.    Recognize the warning signs:  Abrupt changes in personality, positive or negative- including increase in energy   Giving away possessions  Change in eating patterns- significant weight changes-  positive or negative  Change in sleeping patterns- unable to sleep or sleeping all the time   Unwillingness or inability to communicate  Depression  Unusual sadness, discouragement and loneliness  Talk of  wanting to die  Neglect of personal appearance   Rebelliousness- reckless behavior  Withdrawal from people/activities they love  Confusion- inability to concentrate     If you or a loved one observes any of these behaviors or has concerns about self-harm, here's what you can do:  Talk about it- your feelings and reasons for harming yourself  Remove any means that you might use to hurt yourself (examples: pills, rope, extension cords, firearm)  Get professional help from the community (Mental Health, Substance Abuse, psychological counseling)  Do not be alone:Call your Safe Contact- someone whom you trust who will be there for you.  Call your local CRISIS HOTLINE 618-3935 or 856-217-1815  Call your local Children's Mobile Crisis Response Team [place] (896) 457-1115 or www.[employer]  Call the toll free National Suicide Prevention Hotlines   National Suicide Prevention Lifeline 024-475-RBZK (7202)  [place] Line Network 800-SUICIDE (538-9325)  ACTIVITY: Rest and take it easy for the first 24 hours.  A responsible adult is recommended to remain with you during that time.  It is normal to feel sleepy.  We encourage you to not do anything that requires balance, judgment or coordination.    MILD FLU-LIKE SYMPTOMS ARE NORMAL. YOU MAY EXPERIENCE GENERALIZED MUSCLE ACHES, THROAT IRRITATION, HEADACHE AND/OR SOME NAUSEA.    FOR 24 HOURS DO NOT:  Drive, operate machinery or run household appliances.  Drink beer or alcoholic beverages.   Make important decisions or sign legal documents.    SPECIAL INSTRUCTIONS: ***    DIET: To avoid nausea, slowly advance diet as tolerated, avoiding spicy or greasy foods for the first day.  Add more substantial food to your diet according to your physician's instructions.  Babies can be fed formula or breast milk as soon as they are hungry.  INCREASE FLUIDS AND FIBER TO AVOID CONSTIPATION.    SURGICAL DRESSING/BATHING: ***    FOLLOW-UP APPOINTMENT:  A follow-up appointment  should be arranged with your doctor in ***; call to schedule.    You should CALL YOUR PHYSICIAN if you develop:  Fever greater than 101 degrees F.  Pain not relieved by medication, or persistent nausea or vomiting.  Excessive bleeding (blood soaking through dressing) or unexpected drainage from the wound.  Extreme redness or swelling around the incision site, drainage of pus or foul smelling drainage.  Inability to urinate or empty your bladder within 8 hours.  Problems with breathing or chest pain.    You should call 911 if you develop problems with breathing or chest pain.  If you are unable to contact your doctor or surgical center, you should go to the nearest emergency room or urgent care center.  Physician's telephone #: ***    If any questions arise, call your doctor.  If your doctor is not available, please feel free to call the Surgical Center at {Surgical Dept Numbers:[id number]}.  The Center is open Monday through Friday from 7AM to 7PM.  You can also call the HEALTH HOTLINE open 24 hours/day, 7 days/week and speak to a nurse at [phone], or toll free at [phone].    A registered nurse may call you a few days after your surgery to see how you are doing after your procedure.    MEDICATIONS: Resume taking daily medication.  Take prescribed pain medication with food.  If no medication is prescribed, you may take non-aspirin pain medication if needed.  PAIN MEDICATION CAN BE VERY CONSTIPATING.  Take a stool softener or laxative such as senokot, pericolace, or milk of magnesia if needed.    Prescription given for ***.  Last pain medication given at ***.    If your physician has prescribed pain medication that includes Acetaminophen (Tylenol), do not take additional Acetaminophen (Tylenol) while taking the prescribed medication.    Depression / Suicide Risk    As you are discharged from this [facility] facility, it is important to learn how to keep safe from harming yourself.    Recognize the warning  signs:  · Abrupt changes in personality, positive or negative- including increase in energy   · Giving away possessions  · Change in eating patterns- significant weight changes-  positive or negative  · Change in sleeping patterns- unable to sleep or sleeping all the time   · Unwillingness or inability to communicate  · Depression  · Unusual sadness, discouragement and loneliness  · Talk of wanting to die  · Neglect of personal appearance   · Rebelliousness- reckless behavior  · Withdrawal from people/activities they love  · Confusion- inability to concentrate     If you or a loved one observes any of these behaviors or has concerns about self-harm, here's what you can do:  · Talk about it- your feelings and reasons for harming yourself  · Remove any means that you might use to hurt yourself (examples: pills, rope, extension cords, firearm)  · Get professional help from the community (Mental Health, Substance Abuse, psychological counseling)  · Do not be alone:Call your Safe Contact- someone whom you trust who will be there for you.  · Call your local CRISIS HOTLINE 846-1637 or 749-721-8359  · Call your local Children's Mobile Crisis Response Team [place] (296) 446-0807 or www.[employer]  · Call the toll free National Suicide Prevention Hotlines   · National Suicide Prevention Lifeline 899-003-VBCT (3874)  National Hope Line Network 800-SUICIDE (698-9543)    Peripheral Nerve Block Discharge Instructions from Same Day Surgery and Inpatient :    What to Expect - Lower Extremity  The block may cause you to experience numbness and weakness in your {LEG LOCATION PNB:935647} on the same side as your surgery  Numbness, tingling and / or weakness are all normal. For some people, this may be an unpleasant sensation  These issues will be resolved when the local anesthetic wears off   You may experience numbness and tingling in your thigh on the same side as your surgery if the block medicine was injected at your  "groin area  Numbness will make it difficult to walk  You may have problems with balance and walking so be very careful   Follow your surgeon's direction regarding weight bearing on your surgical limb  Be very careful with your numb limb  Precautions  The numbness may affect your balance  Be careful when walking or moving around  Your leg may be weak: be very careful putting weight on it  If your surgeon did not specify a time, you should not bear weight for 24 hours  Be sure to ask for help when you need it  It is better to have help than to fall and hurt yourself  What to Expect - Upper Extremity  You may experience numbness and weakness in {ARM LOCATION PNB:294561}  on the same side as your surgery  This is normal. For some people, this may be an unpleasant sensation. Be very careful with your numb limb  Ask for help when you need it  Shoulder Surgery Side Effects  In addition to numbness and weakness you may experience other symptoms  Other nerves that are close to those nerves injected can also be affected by local anesthesia  You may experience a hoarseness in your voice  Your breathing may feel different  You may also notice drooping of your eyelid, pupil constriction, and decreased sweating, on the side of your surgery  All of these side effects are normal and will resolve when the local anesthetic wears off   Prevent Injury  Protect the limb like a baby  Beware of exposing your limb to extreme heat or cold or trauma  The limb may be injured without you noticing because it is numb  Keep the limb elevated whenever possible  Do not sleep on the limb  Change the position of the limb regularly  Avoid putting pressure on your surgical limb  Pain Control  The initial block on the day of surgery will make your extremity feel \"numb\"  Any consecutive injection including prior to discharge from the hospital will make your extremity feel \"numb\"  You may feel an aching or burning when the local anesthesia starts to wear " off  Take pain pills as prescribed by your surgeon  Call your surgeon or anesthesiologist if you do not have adequate pain control

## 2018-02-22 DIAGNOSIS — E78.00 HYPERCHOLESTEREMIA: ICD-10-CM

## 2018-02-22 DIAGNOSIS — Z13.0 SCREENING FOR DEFICIENCY ANEMIA: ICD-10-CM

## 2018-02-22 DIAGNOSIS — E55.9 VITAMIN D INSUFFICIENCY: ICD-10-CM

## 2018-02-23 RX ORDER — SIMVASTATIN 20 MG
TABLET ORAL
Qty: 90 TAB | Refills: 0 | Status: SHIPPED | OUTPATIENT
Start: 2018-02-23 | End: 2018-06-04 | Stop reason: SDUPTHER

## 2018-02-23 NOTE — TELEPHONE ENCOUNTER
Refill done, but she is due for fasting blood work and follow up visit.   Please see if pt transitioning to IPC.

## 2018-06-04 RX ORDER — SIMVASTATIN 20 MG
20 TABLET ORAL EVERY EVENING
Qty: 90 TAB | Refills: 0 | Status: SHIPPED | OUTPATIENT
Start: 2018-06-04 | End: 2018-09-08 | Stop reason: SDUPTHER

## 2018-06-04 NOTE — TELEPHONE ENCOUNTER
Pt is due for labs and follow up. Please have her complete fasting labs, in system.   Recommend schedule appointment.   Refill done at this time.

## 2018-10-06 DIAGNOSIS — Z13.0 SCREENING FOR DEFICIENCY ANEMIA: ICD-10-CM

## 2018-10-06 DIAGNOSIS — E55.9 VITAMIN D INSUFFICIENCY: ICD-10-CM

## 2018-10-06 DIAGNOSIS — E78.00 HYPERCHOLESTEROLEMIA: ICD-10-CM

## 2018-10-12 ENCOUNTER — HOSPITAL ENCOUNTER (OUTPATIENT)
Dept: RADIOLOGY | Facility: MEDICAL CENTER | Age: 63
End: 2018-10-12
Attending: OBSTETRICS & GYNECOLOGY
Payer: COMMERCIAL

## 2018-10-12 DIAGNOSIS — Z12.31 SCREENING MAMMOGRAM, ENCOUNTER FOR: ICD-10-CM

## 2018-10-12 PROCEDURE — 77067 SCR MAMMO BI INCL CAD: CPT

## 2018-10-18 LAB
25(OH)D3+25(OH)D2 SERPL-MCNC: 44.4 NG/ML (ref 30–100)
ALBUMIN SERPL-MCNC: 4.3 G/DL (ref 3.6–4.8)
ALBUMIN/GLOB SERPL: 1.5 {RATIO} (ref 1.2–2.2)
ALP SERPL-CCNC: 55 IU/L (ref 39–117)
ALT SERPL-CCNC: 14 IU/L (ref 0–32)
AST SERPL-CCNC: 21 IU/L (ref 0–40)
BASOPHILS # BLD AUTO: 0 X10E3/UL (ref 0–0.2)
BASOPHILS NFR BLD AUTO: 0 %
BILIRUB SERPL-MCNC: 0.4 MG/DL (ref 0–1.2)
BUN SERPL-MCNC: 13 MG/DL (ref 8–27)
BUN/CREAT SERPL: 15 (ref 12–28)
CALCIUM SERPL-MCNC: 9.5 MG/DL (ref 8.7–10.3)
CHLORIDE SERPL-SCNC: 101 MMOL/L (ref 96–106)
CHOLEST SERPL-MCNC: 182 MG/DL (ref 100–199)
CO2 SERPL-SCNC: 26 MMOL/L (ref 20–29)
CREAT SERPL-MCNC: 0.88 MG/DL (ref 0.57–1)
EOSINOPHIL # BLD AUTO: 0.1 X10E3/UL (ref 0–0.4)
EOSINOPHIL NFR BLD AUTO: 1 %
ERYTHROCYTE [DISTWIDTH] IN BLOOD BY AUTOMATED COUNT: 14.9 % (ref 12.3–15.4)
GLOBULIN SER CALC-MCNC: 2.8 G/DL (ref 1.5–4.5)
GLUCOSE SERPL-MCNC: 90 MG/DL (ref 65–99)
HCT VFR BLD AUTO: 40.3 % (ref 34–46.6)
HDLC SERPL-MCNC: 77 MG/DL
HGB BLD-MCNC: 13.3 G/DL (ref 11.1–15.9)
IF AFRICAN AMERICAN  100797: 82 ML/MIN/1.73
IF NON AFRICAN AMER 100791: 71 ML/MIN/1.73
IMM GRANULOCYTES # BLD: 0 X10E3/UL (ref 0–0.1)
IMM GRANULOCYTES NFR BLD: 0 %
IMMATURE CELLS  115398: NORMAL
LABORATORY COMMENT REPORT: NORMAL
LDLC SERPL CALC-MCNC: 88 MG/DL (ref 0–99)
LYMPHOCYTES # BLD AUTO: 2.1 X10E3/UL (ref 0.7–3.1)
LYMPHOCYTES NFR BLD AUTO: 45 %
MCH RBC QN AUTO: 29.6 PG (ref 26.6–33)
MCHC RBC AUTO-ENTMCNC: 33 G/DL (ref 31.5–35.7)
MCV RBC AUTO: 90 FL (ref 79–97)
MONOCYTES # BLD AUTO: 0.3 X10E3/UL (ref 0.1–0.9)
MONOCYTES NFR BLD AUTO: 6 %
MORPHOLOGY BLD-IMP: NORMAL
NEUTROPHILS # BLD AUTO: 2.2 X10E3/UL (ref 1.4–7)
NEUTROPHILS NFR BLD AUTO: 48 %
NRBC BLD AUTO-RTO: NORMAL %
PLATELET # BLD AUTO: 242 X10E3/UL (ref 150–379)
POTASSIUM SERPL-SCNC: 4.3 MMOL/L (ref 3.5–5.2)
PROT SERPL-MCNC: 7.1 G/DL (ref 6–8.5)
RBC # BLD AUTO: 4.5 X10E6/UL (ref 3.77–5.28)
SODIUM SERPL-SCNC: 141 MMOL/L (ref 134–144)
TRIGL SERPL-MCNC: 87 MG/DL (ref 0–149)
TSH SERPL DL<=0.005 MIU/L-ACNC: 3.06 UIU/ML (ref 0.45–4.5)
VLDLC SERPL CALC-MCNC: 17 MG/DL (ref 5–40)
WBC # BLD AUTO: 4.7 X10E3/UL (ref 3.4–10.8)

## 2018-11-01 ENCOUNTER — OFFICE VISIT (OUTPATIENT)
Dept: OTHER | Facility: IMAGING CENTER | Age: 63
End: 2018-11-01
Payer: COMMERCIAL

## 2018-11-01 VITALS
HEIGHT: 62 IN | TEMPERATURE: 98 F | DIASTOLIC BLOOD PRESSURE: 70 MMHG | OXYGEN SATURATION: 98 % | SYSTOLIC BLOOD PRESSURE: 122 MMHG | HEART RATE: 52 BPM | WEIGHT: 157 LBS | BODY MASS INDEX: 28.89 KG/M2

## 2018-11-01 DIAGNOSIS — E55.9 VITAMIN D INSUFFICIENCY: ICD-10-CM

## 2018-11-01 DIAGNOSIS — Z12.11 SCREEN FOR COLON CANCER: ICD-10-CM

## 2018-11-01 DIAGNOSIS — Z71.85 IMMUNIZATION COUNSELING: ICD-10-CM

## 2018-11-01 DIAGNOSIS — E78.00 HYPERCHOLESTEREMIA: ICD-10-CM

## 2018-11-01 DIAGNOSIS — Z00.00 WELL ADULT EXAM: ICD-10-CM

## 2018-11-01 DIAGNOSIS — Z86.010 HISTORY OF COLON POLYPS: ICD-10-CM

## 2018-11-01 PROCEDURE — 99396 PREV VISIT EST AGE 40-64: CPT | Performed by: FAMILY MEDICINE

## 2018-11-01 RX ORDER — SIMVASTATIN 20 MG
20 TABLET ORAL EVERY EVENING
Qty: 90 TAB | Refills: 3 | Status: SHIPPED | OUTPATIENT
Start: 2018-11-01 | End: 2019-11-25 | Stop reason: SDUPTHER

## 2018-11-01 ASSESSMENT — PAIN SCALES - GENERAL: PAINLEVEL: NO PAIN

## 2018-11-01 ASSESSMENT — PATIENT HEALTH QUESTIONNAIRE - PHQ9: CLINICAL INTERPRETATION OF PHQ2 SCORE: 0

## 2018-11-01 NOTE — LETTER
[facility]  Haley Jin M.D.  [address] [place] [address]  [place] [address]  Fax: [phone]   Authorization for Release/Disclosure of   Protected Health Information   Name: CINDI MATHEW : 1955 SSN: [id number]   Address: [facility] [place] [address] Phone:    [phone] (home) [phone] (work)   I authorize the entity listed below to release/disclose the PHI below to:   [facility]/Haley Jin M.D. and Haley Jin M.D.   Provider or Entity Name:  Dr. Schmitt   Address   [facility], [facility]   Phone:      Fax:     Reason for request: continuity of care   Information to be released:    [  ] LAST COLONOSCOPY,  including any PATH REPORT and follow-up  [  ] LAST FIT/COLOGUARD RESULT [  ] LAST DEXA  [  ] LAST MAMMOGRAM  [x] LAST PAP  [  ] LAST LABS [  ] RETINA EXAM REPORT  [  ] IMMUNIZATION RECORDS  [  ] Release all info      [  ] Check here and initial the line next to each item to release ALL health information INCLUDING  _____ Care and treatment for drug and / or alcohol abuse  _____ HIV testing, infection status, or AIDS  _____ Genetic Testing    DATES OF SERVICE OR TIME PERIOD TO BE DISCLOSED: _____________  I understand and acknowledge that:  * This Authorization may be revoked at any time by you in writing, except if your health information has already been used or disclosed.  * Your health information that will be used or disclosed as a result of you signing this authorization could be re-disclosed by the recipient. If this occurs, your re-disclosed health information may no longer be protected by State or Federal laws.  * You may refuse to sign this Authorization. Your refusal will not affect your ability to obtain treatment.  * This Authorization becomes effective upon signing and will  on (date) __________.      If no date is indicated, this Authorization will  one (1) year from the signature date.    Name: Cindi Mathew    Signature:   Date:     2018            PLEASE FAX REQUESTED RECORDS BACK TO: [phone]

## 2018-11-01 NOTE — PROGRESS NOTES
Chief Complaint   Patient presents with   • Hyperlipidemia         <SUBJECTIVE>  Cindi Cooper is a 63 y.o. female for routine annual evaluation.     Obstetric History       T0      L0     SAB0   TAB2   Ectopic0   Molar0   Multiple0   Live Births0      Hyperlipidemia- has been overall stable on simvastatin 20 mg. Tolerates well. No unusual muscle aches. Patient feels comfortable on current medication and dose.   She eats and overall healthy diet.   She exercises regularly- walking, bar class- mixture of exercise activities.   1 year ago had right shoulder surgery for rotator cuff repair, doing well.     Vitamin D insufficiency- improved on last labs. She is on vitamin D supplements.     Health Maintenance:  Last pap: 2018- exam per gyn- Dr. Schmitt, no pap but have pelvic exam this year.   Diet: overall healthy  Calcium: dairy, supplements as listed.   Exercise: routine  Immunizations: Tdap - uncertain when- discussed recommendations; Declined flu vaccine recommendations. Discussed shingrix recommendations. Handout of vaccination information given.   Mammogram: 10/2018- per gynecology  Colonoscopy: - repeat in 10 years (2019) per patient, states that is what her insurance told her. Polyp- on past noted on colonoscopy.   Dexa Scan: completed in past, has been a while-normal. Plan for repeat by age 68 yo.       Current Outpatient Prescriptions   Medication Sig Dispense Refill   • aspirin 81 MG tablet Take 81 mg by mouth every day.     • simvastatin (ZOCOR) 20 MG Tab Take 1 Tab by mouth every evening. 90 Tab 3   • simvastatin (ZOCOR) 20 MG Tab TAKE 1 TABLET BY MOUTH EVERY DAY IN THE EVENING 30 Tab 0   • Cholecalciferol (VITAMIN D PO) Take 1 Tab by mouth every day.     • Ascorbic Acid (VITAMIN C PO) Take 1 Tab by mouth every day.     • Cyanocobalamin (VITAMIN B 12 PO) Take 1 Tab by mouth every day.     • CALCIUM PO Take 1 Tab by mouth every day.     • Glucosamine-Chondroitin (GLUCOSAMINE  CHONDR COMPLEX PO) Take 1 Tab by mouth every day.     • Prenatal Vit-Fe Fumarate-FA (M-VIT PO) Take 1 Tab by mouth every day.     • POTASSIUM PO Take 1 Tab by mouth every day.       No current facility-administered medications for this visit.      Drug allergies: Patient has no known allergies.    Past Medical History:   Diagnosis Date   • Complete tear of right rotator cuff 10/5/2017   • High cholesterol    • Hyperlipidemia    • Snoring        Past Surgical History:   Procedure Laterality Date   • SHOULDER DECOMPRESSION ARTHROSCOPIC Right 10/5/2017    Procedure: SHOULDER DECOMPRESSION ARTHROSCOPIC SUBACROMIAL;  Surgeon: Mimi Wood M.D.;  Location: [facility];  Service: Orthopedics   • ARTHROSCOPIC LABRAL DEBRIDEMENT Right 10/5/2017    Procedure: ARTHROSCOPIC LABRAL DEBRIDEMENT;  Surgeon: Mimi Wood M.D.;  Location: [facility];  Service: Orthopedics   • SHOULDER ARTHROSCOPY W/ ROTATOR CUFF REPAIR Right 10/5/2017    Procedure: SHOULDER ARTHROSCOPY W/ ROTATOR CUFF REPAIR;  Surgeon: Mimi Wood M.D.;  Location: SURGERY [facility];  Service: Orthopedics   • PB ENLARGE BREAST WITH IMPLANT    1998       Family History   Problem Relation Age of Onset   • Cancer Mother         breast   • Cancer Paternal Grandmother         breast       Social History     Social History   • Marital status:      Spouse name: N/A   • Number of children: N/A   • Years of education: N/A     Occupational History   •       Social History Main Topics   • Smoking status: Former Smoker     Types: Cigarettes     Quit date: 1/1/1971   • Smokeless tobacco: Never Used      Comment: on and off as teen   • Alcohol use 1.8 oz/week     3 Standard drinks or equivalent per week      Comment: 3 per week   • Drug use: Yes     Types: Marijuana      Comment: rare   • Sexual activity: Yes     Partners: Male     Other Topics Concern   • Not on file     Social History Narrative    , 2  "children.          ROS:  No fevers/chills. No TIA's or unusual headaches, no dysphagia. No prolonged cough. No dyspnea or chest pain on exertion.  No abdominal pain, change in bowel habits, black or bloody stools.  No urinary tract symptoms. On self exam, has noted no new or enlarging breast lumps, nipple discharge or breast pain. Gyn: No abnormal discharge or bleeding.     <OBJECTIVE>  /70   Pulse (!) 52   Temp 36.7 °C (98 °F)   Ht 1.575 m (5' 2\")   Wt 71.2 kg (157 lb)   SpO2 98%   BMI 28.72 kg/m²   HEAD AND NECK:  Ears normal.  Throat, oral cavity and tongue normal.  Neck supple. No adenopathy or masses in the neck or supraclavicular regions.  No carotid bruits. No thyromegaly.  NEURO: Cranial nerves are normal. Neck supple. DTR's normal and symmetric.  CHEST:  Clear, good air entry, no wheezes, rhonchi or rales.  HEART:  S1 and S2 normal, no murmurs, clicks, gallops or rubs. Regular rate and rhythm.  No edema.  ABDOMEN:  Soft without tenderness, guarding, mass or organomegaly. No CVA tenderness or inguinal adenopathy.  EXTREMITIES:  Extremities, reflexes and peripheral pulses are normal.  SKIN:  No rashes or suspicious skin lesions noted.       Ref. Range 10/17/2018 09:14   WBC Latest Ref Range: 3.4 - 10.8 x10E3/uL 4.7   RBC Latest Ref Range: 3.77 - 5.28 x10E6/uL 4.50   Hemoglobin Latest Ref Range: 11.1 - 15.9 g/dL 13.3   Hematocrit Latest Ref Range: 34.0 - 46.6 % 40.3   MCV Latest Ref Range: 79 - 97 fL 90   MCH Latest Ref Range: 26.6 - 33.0 pg 29.6   MCHC Latest Ref Range: 31.5 - 35.7 g/dL 33.0   RDW Latest Ref Range: 12.3 - 15.4 % 14.9   Platelet Count Latest Ref Range: 150 - 379 x10E3/uL 242   Immature Cells Unknown CANCELED   Neutrophils-Polys Latest Ref Range: Not Estab. % 48   Neutrophils (Absolute) Latest Ref Range: 1.4 - 7.0 x10E3/uL 2.2   Lymphocytes Latest Ref Range: Not Estab. % 45   Lymphs (Absolute) Latest Ref Range: 0.7 - 3.1 x10E3/uL 2.1   Monocytes Latest Ref Range: Not Estab. % 6 "   Monos (Absolute) Latest Ref Range: 0.1 - 0.9 x10E3/uL 0.3   Eosinophils Latest Ref Range: Not Estab. % 1   Eos (Absolute) Latest Ref Range: 0.0 - 0.4 x10E3/uL 0.1   Basophils Latest Ref Range: Not Estab. % 0   Baso (Absolute) Latest Ref Range: 0.0 - 0.2 x10E3/uL 0.0   Immature Granulocytes Latest Ref Range: Not Estab. % 0   Immature Granulocytes (abs) Latest Ref Range: 0.0 - 0.1 x10E3/uL 0.0   Nucleated RBC Unknown CANCELED   Comments-Diff Unknown CANCELED   Sodium Latest Ref Range: 134 - 144 mmol/L 141   Potassium Latest Ref Range: 3.5 - 5.2 mmol/L 4.3   Chloride Latest Ref Range: 96 - 106 mmol/L 101   Co2 Latest Ref Range: 20 - 29 mmol/L 26   Glucose Latest Ref Range: 65 - 99 mg/dL 90   Bun Latest Ref Range: 8 - 27 mg/dL 13   Creatinine Latest Ref Range: 0.57 - 1.00 mg/dL 0.88   GFR If  Latest Ref Range: >59 mL/min/1.73 82   GFR If Non  Latest Ref Range: >59 mL/min/1.73 71   Bun-Creatinine Ratio Latest Ref Range: 12 - 28  15   Calcium Latest Ref Range: 8.7 - 10.3 mg/dL 9.5   AST(SGOT) Latest Ref Range: 0 - 40 IU/L 21   ALT(SGPT) Latest Ref Range: 0 - 32 IU/L 14   Alkaline Phosphatase Latest Ref Range: 39 - 117 IU/L 55   Total Bilirubin Latest Ref Range: 0.0 - 1.2 mg/dL 0.4   Albumin Latest Ref Range: 3.6 - 4.8 g/dL 4.3   Total Protein Latest Ref Range: 6.0 - 8.5 g/dL 7.1   Globulin Latest Ref Range: 1.5 - 4.5 g/dL 2.8   A-G Ratio Latest Ref Range: 1.2 - 2.2  1.5   Cholesterol,Tot Latest Ref Range: 100 - 199 mg/dL 182   Triglycerides Latest Ref Range: 0 - 149 mg/dL 87   HDL Latest Ref Range: >39 mg/dL 77   LDL Latest Ref Range: 0 - 99 mg/dL 88   VLDL Cholesterol Calc Latest Ref Range: 5 - 40 mg/dL 17   Comment: Unknown CANCELED   25-Hydroxy   Vitamin D 25 Latest Ref Range: 30.0 - 100.0 ng/mL 44.4   TSH Latest Ref Range: 0.450 - 4.500 uIU/mL 3.060         ASSESSMENT/PLAN:    63 year old female for well exam.     1. Well adult exam  -Discussed calcium intake, healthy diet, and  routine exercise.  Continue routine weight bearing exercise.     2. Hypercholesteremia- controlled.   -Discussed possible decrease in simvastatin dose as lipids are well controlled. Patient currently doing well and feels comfortable with current medication and dose, thus will continue on simvastatin 20 mg daily. Continue heart healthy diet. Recommend reading material about plan based diets.  Continue to monitor labs in 6-12 months.     3. Vitamin D insufficiency- improved, stable.   -Will continue to monitor in future.      4. Immunization counseling   -Discussed recommendations for vaccinations as above. Handout of information given. Patient will schedule in future for nurse visit when ready.       5. History of colon polyps- reviewed last colonoscopy report. Appears due for colonoscopy. Referral done.   REFERRAL TO GI FOR COLONOSCOPY   6. Screen for colon cancer  REFERRAL TO GI FOR COLONOSCOPY       Return in about 1 year (around 11/1/2019).

## 2018-11-02 PROBLEM — Z86.010 HISTORY OF COLON POLYPS: Status: ACTIVE | Noted: 2018-11-02

## 2018-11-02 PROBLEM — M75.121 COMPLETE TEAR OF RIGHT ROTATOR CUFF: Status: RESOLVED | Noted: 2017-10-05 | Resolved: 2018-11-02

## 2018-11-02 PROBLEM — Z86.0100 HISTORY OF COLON POLYPS: Status: ACTIVE | Noted: 2018-11-02

## 2019-11-07 ENCOUNTER — OFFICE VISIT (OUTPATIENT)
Dept: MEDICAL GROUP | Facility: IMAGING CENTER | Age: 64
End: 2019-11-07
Payer: COMMERCIAL

## 2019-11-07 ENCOUNTER — HOSPITAL ENCOUNTER (OUTPATIENT)
Dept: RADIOLOGY | Facility: MEDICAL CENTER | Age: 64
End: 2019-11-07
Attending: FAMILY MEDICINE
Payer: COMMERCIAL

## 2019-11-07 VITALS
BODY MASS INDEX: 27.05 KG/M2 | HEIGHT: 62 IN | SYSTOLIC BLOOD PRESSURE: 112 MMHG | RESPIRATION RATE: 14 BRPM | HEART RATE: 64 BPM | WEIGHT: 147 LBS | DIASTOLIC BLOOD PRESSURE: 70 MMHG | TEMPERATURE: 98.6 F | OXYGEN SATURATION: 96 %

## 2019-11-07 DIAGNOSIS — M54.50 BILATERAL LOW BACK PAIN WITHOUT SCIATICA, UNSPECIFIED CHRONICITY: ICD-10-CM

## 2019-11-07 PROCEDURE — 72100 X-RAY EXAM L-S SPINE 2/3 VWS: CPT

## 2019-11-07 PROCEDURE — 99214 OFFICE O/P EST MOD 30 MIN: CPT | Performed by: FAMILY MEDICINE

## 2019-11-07 NOTE — PROGRESS NOTES
SUBJECTIVE:      Chief Complaint   Patient presents with   • Low Back Pain     on and off x 1.5 month        HPI:     Cindi Cooper is a 64 y.o. female here for symptoms of lower back pain for past month and a half.  Does not recall a specific injury to the area.  States that she is very active.  She does the [employer] class and walks her dog regularly.  She has also recently started to take up golf again in the past few months since about July.  Due to her shoulder she previously did not play for about 2 years.  Plays 9 holes of golf.  Her low back pain occurs on and off depending on how active she is.  She finds that Tylenol helps.  Notices occasional stiffness of she is working in the yard prior to that but no other significant pain symptoms.  Denies any lower extremity weakness, numbness or tingling  No bowel or bladder incontinence.  Prior to traveling to [place] she did see Dr. Sanchez for chiropractic treatment.  Not sure it helped the time.  No imaging was done at that time.  She feels that with her routine exercise activities she gets core strengthening and regular stretching activities.      ROS:  Denies any recent fevers or chills. No nausea or vomiting. No diarrhea. No chest pains or shortness of breath. No lower extremity edema.    Current Outpatient Medications on File Prior to Visit   Medication Sig Dispense Refill   • simvastatin (ZOCOR) 20 MG Tab TAKE 1 TABLET BY MOUTH EVERY DAY IN THE EVENING 30 Tab 0   • Prenatal Vit-Fe Fumarate-FA (M-VIT PO) Take 1 Tab by mouth every day.     • Cholecalciferol (VITAMIN D PO) Take 1 Tab by mouth every day.     • Ascorbic Acid (VITAMIN C PO) Take 1 Tab by mouth every day.     • Cyanocobalamin (VITAMIN B 12 PO) Take 1 Tab by mouth every day.     • CALCIUM PO Take 1 Tab by mouth every day.     • Glucosamine-Chondroitin (GLUCOSAMINE CHONDR COMPLEX PO) Take 1 Tab by mouth every day.     • POTASSIUM PO Take 1 Tab by mouth every day.     • aspirin 81 MG tablet Take 81 mg by  "mouth every day.     • simvastatin (ZOCOR) 20 MG Tab Take 1 Tab by mouth every evening. 90 Tab 3     No current facility-administered medications on file prior to visit.        No Known Allergies    Patient Active Problem List    Diagnosis Date Noted   • History of colon polyps 11/02/2018   • Vitamin D insufficiency 03/30/2016   • Hypercholesteremia 07/24/2015       Past Medical History:   Diagnosis Date   • Complete tear of right rotator cuff 10/5/2017   • High cholesterol    • Hyperlipidemia    • Snoring        OBJECTIVE:   /70 (BP Location: Left arm, Patient Position: Sitting, BP Cuff Size: Adult)   Pulse 64   Temp 37 °C (98.6 °F) (Temporal)   Resp 14   Ht 1.575 m (5' 2\")   Wt 66.7 kg (147 lb)   SpO2 96%   BMI 26.89 kg/m²   General: Well-developed well-nourished female, no acute distress  Neck: supple, no lymphadenopathy- cervical or supraclavicular, no thyromegaly  Cardiovascular: regular rate and rhythm, no murmurs, gallops, rubs  Lungs: clear to auscultation bilaterally, no wheezes, crackles, or rhonchi  Abdomen: +bowel sounds, soft, nontender, nondistended, no rebound, no guarding, no hepatosplenomegaly  Extremities: no cyanosis, clubbing, edema  Skin: Warm and dry  Psych: appropriate mood and affect  Back exam: spinal tenderness to palpation-negative. Tender to palpation -mild tenderness to palpation of lumbosacral region and paraspinal areas of L5.  Slight tenderness to palpation of superior portion of the SI joints bilaterally.  Negative Inna's test bilaterally.  Range of motion-flexion-hands to toes, extension 30 degrees. Scoliosis-no obvious findings.  Appears to have adequate lateral flexion and rotation.  Lower extremity exam: 5/5 strength throughout, except slight weakness of gluteus medius bilaterally noted.  2+ deep tendon reflexes bilaterally. Neurovascularly intact. Straight leg raise-negative.  Popliteal angle 175 degrees bilaterally.        ASSESSMENT/PLAN:    64 y.o.female     1. " Bilateral low back pain without sciatica, unspecified chronicity -suspect some component of DDD/DJD.  Symptoms may be associated with recent increase in golfing activities.    -Patient may benefit from physical therapy, modification of activities and modifications of her techniques/forms with exercise and golf activities. Referral to physical therapy completed. Discussed modifications of activities at this time.  DX-LUMBAR SPINE-2 OR 3 VIEWS    REFERRAL TO PHYSICAL THERAPY Reason for Therapy: Eval/Treat/Report     Follow-up in 6 to 8 weeks.      This medical record contains text that has been entered with the assistance of computer voice recognition and dictation software.  Therefore, it may contain unintended errors in text, spelling, punctuation, or grammar.    25 minutes spent with this patient of which > 15 minutes spent on counseling and coordination of care as above, excluding any time for procedures.

## 2019-11-25 ENCOUNTER — TELEPHONE (OUTPATIENT)
Dept: MEDICAL GROUP | Facility: IMAGING CENTER | Age: 64
End: 2019-11-25

## 2019-11-25 DIAGNOSIS — E55.9 VITAMIN D INSUFFICIENCY: ICD-10-CM

## 2019-11-25 DIAGNOSIS — Z13.0 SCREENING FOR DEFICIENCY ANEMIA: ICD-10-CM

## 2019-11-25 DIAGNOSIS — Z86.010 HISTORY OF COLON POLYPS: ICD-10-CM

## 2019-11-25 DIAGNOSIS — E78.00 HYPERCHOLESTEREMIA: ICD-10-CM

## 2019-11-25 RX ORDER — SIMVASTATIN 20 MG
20 TABLET ORAL EVERY EVENING
Qty: 90 TAB | Refills: 0 | Status: SHIPPED | OUTPATIENT
Start: 2019-11-25 | End: 2020-03-30

## 2019-12-12 ENCOUNTER — HOSPITAL ENCOUNTER (OUTPATIENT)
Dept: RADIOLOGY | Facility: MEDICAL CENTER | Age: 64
End: 2019-12-12
Attending: FAMILY MEDICINE
Payer: COMMERCIAL

## 2019-12-12 DIAGNOSIS — Z12.31 VISIT FOR SCREENING MAMMOGRAM: ICD-10-CM

## 2019-12-12 PROCEDURE — 77067 SCR MAMMO BI INCL CAD: CPT

## 2020-01-14 ENCOUNTER — HOSPITAL ENCOUNTER (OUTPATIENT)
Dept: LAB | Facility: MEDICAL CENTER | Age: 65
End: 2020-01-14
Attending: FAMILY MEDICINE
Payer: COMMERCIAL

## 2020-01-14 DIAGNOSIS — E55.9 VITAMIN D INSUFFICIENCY: ICD-10-CM

## 2020-01-14 DIAGNOSIS — Z13.0 SCREENING FOR DEFICIENCY ANEMIA: ICD-10-CM

## 2020-01-14 DIAGNOSIS — E78.00 HYPERCHOLESTEREMIA: ICD-10-CM

## 2020-01-14 DIAGNOSIS — Z86.010 HISTORY OF COLON POLYPS: ICD-10-CM

## 2020-01-14 LAB
25(OH)D3 SERPL-MCNC: 37 NG/ML (ref 30–100)
ALBUMIN SERPL BCP-MCNC: 4.5 G/DL (ref 3.2–4.9)
ALBUMIN/GLOB SERPL: 1.6 G/DL
ALP SERPL-CCNC: 48 U/L (ref 30–99)
ALT SERPL-CCNC: 15 U/L (ref 2–50)
ANION GAP SERPL CALC-SCNC: 7 MMOL/L (ref 0–11.9)
AST SERPL-CCNC: 20 U/L (ref 12–45)
BILIRUB SERPL-MCNC: 0.6 MG/DL (ref 0.1–1.5)
BUN SERPL-MCNC: 17 MG/DL (ref 8–22)
CALCIUM SERPL-MCNC: 9.3 MG/DL (ref 8.5–10.5)
CHLORIDE SERPL-SCNC: 105 MMOL/L (ref 96–112)
CHOLEST SERPL-MCNC: 192 MG/DL (ref 100–199)
CO2 SERPL-SCNC: 30 MMOL/L (ref 20–33)
CREAT SERPL-MCNC: 0.87 MG/DL (ref 0.5–1.4)
ERYTHROCYTE [DISTWIDTH] IN BLOOD BY AUTOMATED COUNT: 51.9 FL (ref 35.9–50)
FASTING STATUS PATIENT QL REPORTED: NORMAL
GLOBULIN SER CALC-MCNC: 2.8 G/DL (ref 1.9–3.5)
GLUCOSE SERPL-MCNC: 95 MG/DL (ref 65–99)
HCT VFR BLD AUTO: 42.9 % (ref 37–47)
HDLC SERPL-MCNC: 78 MG/DL
HGB BLD-MCNC: 13.8 G/DL (ref 12–16)
LDLC SERPL CALC-MCNC: 98 MG/DL
MCH RBC QN AUTO: 30.6 PG (ref 27–33)
MCHC RBC AUTO-ENTMCNC: 32.2 G/DL (ref 33.6–35)
MCV RBC AUTO: 95.1 FL (ref 81.4–97.8)
PLATELET # BLD AUTO: 250 K/UL (ref 164–446)
PMV BLD AUTO: 10.6 FL (ref 9–12.9)
POTASSIUM SERPL-SCNC: 4.6 MMOL/L (ref 3.6–5.5)
PROT SERPL-MCNC: 7.3 G/DL (ref 6–8.2)
RBC # BLD AUTO: 4.51 M/UL (ref 4.2–5.4)
SODIUM SERPL-SCNC: 142 MMOL/L (ref 135–145)
TRIGL SERPL-MCNC: 78 MG/DL (ref 0–149)
TSH SERPL DL<=0.005 MIU/L-ACNC: 3.25 UIU/ML (ref 0.38–5.33)
WBC # BLD AUTO: 5 K/UL (ref 4.8–10.8)

## 2020-01-14 PROCEDURE — 84443 ASSAY THYROID STIM HORMONE: CPT

## 2020-01-14 PROCEDURE — 80053 COMPREHEN METABOLIC PANEL: CPT

## 2020-01-14 PROCEDURE — 36415 COLL VENOUS BLD VENIPUNCTURE: CPT

## 2020-01-14 PROCEDURE — 85027 COMPLETE CBC AUTOMATED: CPT

## 2020-01-14 PROCEDURE — 80061 LIPID PANEL: CPT

## 2020-01-14 PROCEDURE — 82306 VITAMIN D 25 HYDROXY: CPT

## 2020-10-01 ENCOUNTER — OFFICE VISIT (OUTPATIENT)
Dept: MEDICAL GROUP | Facility: IMAGING CENTER | Age: 65
End: 2020-10-01
Payer: COMMERCIAL

## 2020-10-01 VITALS
SYSTOLIC BLOOD PRESSURE: 108 MMHG | HEART RATE: 62 BPM | DIASTOLIC BLOOD PRESSURE: 72 MMHG | RESPIRATION RATE: 12 BRPM | OXYGEN SATURATION: 97 % | HEIGHT: 61 IN | BODY MASS INDEX: 27.75 KG/M2 | WEIGHT: 147 LBS | TEMPERATURE: 98.1 F

## 2020-10-01 DIAGNOSIS — Z13.0 SCREENING FOR DEFICIENCY ANEMIA: ICD-10-CM

## 2020-10-01 DIAGNOSIS — Z83.3 FAMILY HISTORY OF DIABETES MELLITUS (DM): ICD-10-CM

## 2020-10-01 DIAGNOSIS — E78.00 HYPERCHOLESTEREMIA: ICD-10-CM

## 2020-10-01 DIAGNOSIS — E55.9 VITAMIN D INSUFFICIENCY: ICD-10-CM

## 2020-10-01 DIAGNOSIS — Z23 NEED FOR INFLUENZA VACCINATION: ICD-10-CM

## 2020-10-01 DIAGNOSIS — Z13.29 SCREENING FOR ENDOCRINE DISORDER: ICD-10-CM

## 2020-10-01 DIAGNOSIS — Z23 NEED FOR TDAP VACCINATION: ICD-10-CM

## 2020-10-01 DIAGNOSIS — Z00.00 WELL ADULT EXAM: ICD-10-CM

## 2020-10-01 DIAGNOSIS — Z11.59 NEED FOR HEPATITIS C SCREENING TEST: ICD-10-CM

## 2020-10-01 DIAGNOSIS — Z86.010 HISTORY OF COLON POLYPS: ICD-10-CM

## 2020-10-01 PROCEDURE — 90715 TDAP VACCINE 7 YRS/> IM: CPT | Performed by: FAMILY MEDICINE

## 2020-10-01 PROCEDURE — 90686 IIV4 VACC NO PRSV 0.5 ML IM: CPT | Performed by: FAMILY MEDICINE

## 2020-10-01 PROCEDURE — 90472 IMMUNIZATION ADMIN EACH ADD: CPT | Performed by: FAMILY MEDICINE

## 2020-10-01 PROCEDURE — 99396 PREV VISIT EST AGE 40-64: CPT | Mod: 25 | Performed by: FAMILY MEDICINE

## 2020-10-01 PROCEDURE — 90471 IMMUNIZATION ADMIN: CPT | Performed by: FAMILY MEDICINE

## 2020-10-01 SDOH — HEALTH STABILITY: PHYSICAL HEALTH: ON AVERAGE, HOW MANY DAYS PER WEEK DO YOU ENGAGE IN MODERATE TO STRENUOUS EXERCISE (LIKE A BRISK WALK)?: 7 DAYS

## 2020-10-01 SDOH — HEALTH STABILITY: PHYSICAL HEALTH: ON AVERAGE, HOW MANY MINUTES DO YOU ENGAGE IN EXERCISE AT THIS LEVEL?: 50 MIN

## 2020-10-01 SDOH — HEALTH STABILITY: MENTAL HEALTH: HOW OFTEN DO YOU HAVE A DRINK CONTAINING ALCOHOL?: 2-3 TIMES A WEEK

## 2020-10-01 SDOH — HEALTH STABILITY: MENTAL HEALTH
STRESS IS WHEN SOMEONE FEELS TENSE, NERVOUS, ANXIOUS, OR CAN'T SLEEP AT NIGHT BECAUSE THEIR MIND IS TROUBLED. HOW STRESSED ARE YOU?: NOT AT ALL

## 2020-10-01 ASSESSMENT — FIBROSIS 4 INDEX: FIB4 SCORE: 1.3

## 2020-10-01 ASSESSMENT — PATIENT HEALTH QUESTIONNAIRE - PHQ9: CLINICAL INTERPRETATION OF PHQ2 SCORE: 0

## 2020-10-01 ASSESSMENT — PAIN SCALES - GENERAL: PAINLEVEL: NO PAIN

## 2020-10-01 NOTE — PROGRESS NOTES
Chief Complaint   Patient presents with   • Annual Exam     (scheduled with gyn on 10/5/20)       <SUBJECTIVE>  Cindi Cooper is a 65 y.o. female for routine annual evaluation.    Hypercholesterolemia- hs been stable on simvastatin 20 mg daily, tolerates well. No unusual muscle aches. Healthy diet and routine exercise.  Hx of right shoulder rotator cuff repair, stable.     Vitamin D insufficiency- stable on supplements.      Health Maintenance:  Last pap: Has appointment with Dr. Schmitt.   Diet: overall healthy diet  Supplements as listed.   Exercise: routine- daily, walking day, bar class, elliptical   Immunizations: Tdap and influenza vaccine recommended. Discussed shingrix vaccine recommendations. Discussed shingrix recommendations.   Mammogram: 2019- negative, dense breast tissue.   Colonoscopy: , 2019- repeat in 10 years per report. Polyp- on past noted on colonoscopy.   Dexa Scan: completed in past, has been a while-normal. Plan for repeat by age 68 yo discussed.       Current Outpatient Medications   Medication Sig Dispense Refill   • VITAMIN E PO Take  by mouth.     • simvastatin (ZOCOR) 20 MG Tab TAKE 1 TABLET BY MOUTH EVERY DAY IN THE EVENING 90 Tab 0   • Prenatal Vit-Fe Fumarate-FA (M-VIT PO) Take 1 Tab by mouth every day.     • Cholecalciferol (VITAMIN D PO) Take 1 Tab by mouth every day.     • Ascorbic Acid (VITAMIN C PO) Take 1 Tab by mouth every day.     • Cyanocobalamin (VITAMIN B 12 PO) Take 1 Tab by mouth every day.     • CALCIUM PO Take 1 Tab by mouth every day.     • Glucosamine-Chondroitin (GLUCOSAMINE CHONDR COMPLEX PO) Take 1 Tab by mouth every day.     • POTASSIUM PO Take 1 Tab by mouth every day.       No current facility-administered medications for this visit.      Drug allergies: Patient has no known allergies.     Patient Active Problem List   Diagnosis   • Hypercholesteremia   • Vitamin D insufficiency   • History of colon polyps         OB History    Para Term   AB Living   4 2 0 0 2 0   SAB TAB Ectopic Molar Multiple Live Births   0 2 0 0 0 0         Past Medical History:   Diagnosis Date   • Complete tear of right rotator cuff 10/5/2017   • High cholesterol    • Hyperlipidemia    • Snoring        Past Surgical History:   Procedure Laterality Date   • SHOULDER DECOMPRESSION ARTHROSCOPIC Right 10/5/2017    Procedure: SHOULDER DECOMPRESSION ARTHROSCOPIC SUBACROMIAL;  Surgeon: Mimi Wood M.D.;  Location: SURGERY [facility];  Service: Orthopedics   • ARTHROSCOPIC LABRAL DEBRIDEMENT Right 10/5/2017    Procedure: ARTHROSCOPIC LABRAL DEBRIDEMENT;  Surgeon: Mimi Wood M.D.;  Location: SURGERY [facility];  Service: Orthopedics   • SHOULDER ARTHROSCOPY W/ ROTATOR CUFF REPAIR Right 10/5/2017    Procedure: SHOULDER ARTHROSCOPY W/ ROTATOR CUFF REPAIR;  Surgeon: Mimi Wood M.D.;  Location: SURGERY [facility];  Service: Orthopedics   • PB ENLARGE BREAST WITH IMPLANT           Family History   Problem Relation Age of Onset   • Cancer Mother         breast   • Cancer Paternal Grandmother         breast   • Diabetes Maternal Grandfather        Social History     Socioeconomic History   • Marital status:      Spouse name: Not on file   • Number of children: Not on file   • Years of education: Not on file   • Highest education level: Not on file   Occupational History   • Occupation:    Social Needs   • Financial resource strain: Not on file   • Food insecurity     Worry: Not on file     Inability: Not on file   • Transportation needs     Medical: Not on file     Non-medical: Not on file   Tobacco Use   • Smoking status: Former Smoker     Types: Cigarettes     Quit date: 1971     Years since quittin.7   • Smokeless tobacco: Never Used   • Tobacco comment: on and off as teen   Substance and Sexual Activity   • Alcohol use: Yes     Alcohol/week: 1.8 oz     Types: 3 Standard drinks or equivalent per week     Frequency: 2-3  "times a week     Comment: 3 per week   • Drug use: Not Currently   • Sexual activity: Yes     Partners: Male   Lifestyle   • Physical activity     Days per week: 7 days     Minutes per session: 50 min   • Stress: Not at all   Relationships   • Social connections     Talks on phone: Not on file     Gets together: Not on file     Attends Orthodox service: Not on file     Active member of club or organization: Not on file     Attends meetings of clubs or organizations: Not on file     Relationship status: Not on file   • Intimate partner violence     Fear of current or ex partner: Not on file     Emotionally abused: Not on file     Physically abused: Not on file     Forced sexual activity: Not on file   Other Topics Concern   • Not on file   Social History Narrative    , 2 children.        ROS:  No fevers/chills. No TIA's or unusual headaches, no dysphagia. No prolonged cough. No dyspnea or chest pain on exertion.  No abdominal pain, change in bowel habits, black or bloody stools.  No urinary tract symptoms. On self exam, has noted no new or enlarging breast lumps, nipple discharge or breast pain. Gyn: No abnormal discharge or bleeding.       <OBJECTIVE>  /72   Pulse 62   Temp 36.7 °C (98.1 °F)   Resp 12   Ht 1.549 m (5' 1\")   Wt 66.7 kg (147 lb)   SpO2 97%   BMI 27.78 kg/m²   HEAD AND NECK:  Ears normal.  Throat, oral cavity and tongue normal.  Neck supple. No adenopathy or masses in the neck or supraclavicular regions.  No carotid bruits. No thyromegaly.  NEURO: Cranial nerves are normal. Neck supple. DTR's normal and symmetric.  CHEST:  Clear, good air entry, no wheezes, rhonchi or rales.  HEART:  S1 and S2 normal, no murmurs, clicks, gallops or rubs. Regular rate and rhythm.  No edema.  ABDOMEN:  Soft without tenderness, guarding, mass or organomegaly. No CVA tenderness or inguinal adenopathy.  EXTREMITIES:  Extremities, reflexes and peripheral pulses are normal.  SKIN:  No rashes or suspicious " skin lesions noted.      ASSESSMENT/PLAN:    64 yo female for well exam.     1. Well adult exam   -Discussed calcium intake, healthy diet, and routine exercise.     2. Hypercholesteremia- controlled. Continue current medication, heart healthy diet and routine exercise. Discussed taking CoQ10 supplements. Monitor labs.  Comp Metabolic Panel    Lipid Profile    TSH WITH REFLEX TO FT4   3. Vitamin D insufficiency - on supplement therapy.   -Monitor labs.  VITAMIN D,25 HYDROXY   4. History of colon polyps     5. Family history of diabetes mellitus (DM)  HEMOGLOBIN A1C   6. Need for hepatitis C screening test  HEP C VIRUS ANTIBODY   7. Screening for endocrine disorder  TSH WITH REFLEX TO FT4   8. Screening for deficiency anemia  CBC WITH DIFFERENTIAL   9. Need for Tdap vaccination  Tdap =>8yo IM   10. Need for influenza vaccination  Influenza Vaccine Quad Injection (PF)   She will have prior dexa report per gynecology sent to office.   Reviewed recommendations for vaccinations. On wait list for shingrix vaccine.     Follow up after lab work as needed.   Recommend labwork and follow up every 6-12 months depending on results.   Recommend routine annual exam.     This medical record contains text that has been entered with the assistance of computer voice recognition and dictation software.  Therefore, it may contain unintended errors in text, spelling, punctuation, or grammar.

## 2020-12-14 ENCOUNTER — HOSPITAL ENCOUNTER (OUTPATIENT)
Dept: RADIOLOGY | Facility: MEDICAL CENTER | Age: 65
End: 2020-12-14
Attending: FAMILY MEDICINE
Payer: COMMERCIAL

## 2020-12-14 DIAGNOSIS — Z12.31 VISIT FOR SCREENING MAMMOGRAM: ICD-10-CM

## 2020-12-14 PROCEDURE — 77067 SCR MAMMO BI INCL CAD: CPT

## 2021-01-22 ENCOUNTER — HOSPITAL ENCOUNTER (OUTPATIENT)
Dept: LAB | Facility: MEDICAL CENTER | Age: 66
End: 2021-01-22
Attending: FAMILY MEDICINE
Payer: COMMERCIAL

## 2021-01-22 DIAGNOSIS — D72.819 LEUKOPENIA, UNSPECIFIED TYPE: ICD-10-CM

## 2021-01-22 DIAGNOSIS — Z83.3 FAMILY HISTORY OF DIABETES MELLITUS (DM): ICD-10-CM

## 2021-01-22 DIAGNOSIS — E55.9 VITAMIN D INSUFFICIENCY: ICD-10-CM

## 2021-01-22 DIAGNOSIS — Z11.59 NEED FOR HEPATITIS C SCREENING TEST: ICD-10-CM

## 2021-01-22 DIAGNOSIS — E78.00 HYPERCHOLESTEREMIA: ICD-10-CM

## 2021-01-22 DIAGNOSIS — Z13.29 SCREENING FOR ENDOCRINE DISORDER: ICD-10-CM

## 2021-01-22 DIAGNOSIS — Z13.0 SCREENING FOR DEFICIENCY ANEMIA: ICD-10-CM

## 2021-01-22 LAB
25(OH)D3 SERPL-MCNC: 45 NG/ML (ref 30–100)
ALBUMIN SERPL BCP-MCNC: 4.4 G/DL (ref 3.2–4.9)
ALBUMIN/GLOB SERPL: 1.6 G/DL
ALP SERPL-CCNC: 60 U/L (ref 30–99)
ALT SERPL-CCNC: 12 U/L (ref 2–50)
ANION GAP SERPL CALC-SCNC: 11 MMOL/L (ref 7–16)
AST SERPL-CCNC: 22 U/L (ref 12–45)
BASOPHILS # BLD AUTO: 0.7 % (ref 0–1.8)
BASOPHILS # BLD: 0.03 K/UL (ref 0–0.12)
BILIRUB SERPL-MCNC: 0.5 MG/DL (ref 0.1–1.5)
BUN SERPL-MCNC: 13 MG/DL (ref 8–22)
CALCIUM SERPL-MCNC: 9.4 MG/DL (ref 8.5–10.5)
CHLORIDE SERPL-SCNC: 98 MMOL/L (ref 96–112)
CHOLEST SERPL-MCNC: 196 MG/DL (ref 100–199)
CO2 SERPL-SCNC: 27 MMOL/L (ref 20–33)
CREAT SERPL-MCNC: 0.8 MG/DL (ref 0.5–1.4)
EOSINOPHIL # BLD AUTO: 0.1 K/UL (ref 0–0.51)
EOSINOPHIL NFR BLD: 2.2 % (ref 0–6.9)
ERYTHROCYTE [DISTWIDTH] IN BLOOD BY AUTOMATED COUNT: 48.9 FL (ref 35.9–50)
EST. AVERAGE GLUCOSE BLD GHB EST-MCNC: 108 MG/DL
FASTING STATUS PATIENT QL REPORTED: NORMAL
GLOBULIN SER CALC-MCNC: 2.8 G/DL (ref 1.9–3.5)
GLUCOSE SERPL-MCNC: 98 MG/DL (ref 65–99)
HBA1C MFR BLD: 5.4 % (ref 0–5.6)
HCT VFR BLD AUTO: 41.8 % (ref 37–47)
HCV AB SER QL: NORMAL
HDLC SERPL-MCNC: 80 MG/DL
HGB BLD-MCNC: 13.5 G/DL (ref 12–16)
IMM GRANULOCYTES # BLD AUTO: 0.01 K/UL (ref 0–0.11)
IMM GRANULOCYTES NFR BLD AUTO: 0.2 % (ref 0–0.9)
LDLC SERPL CALC-MCNC: 102 MG/DL
LYMPHOCYTES # BLD AUTO: 2.31 K/UL (ref 1–4.8)
LYMPHOCYTES NFR BLD: 50.1 % (ref 22–41)
MCH RBC QN AUTO: 30.3 PG (ref 27–33)
MCHC RBC AUTO-ENTMCNC: 32.3 G/DL (ref 33.6–35)
MCV RBC AUTO: 93.7 FL (ref 81.4–97.8)
MONOCYTES # BLD AUTO: 0.31 K/UL (ref 0–0.85)
MONOCYTES NFR BLD AUTO: 6.7 % (ref 0–13.4)
NEUTROPHILS # BLD AUTO: 1.85 K/UL (ref 2–7.15)
NEUTROPHILS NFR BLD: 40.1 % (ref 44–72)
NRBC # BLD AUTO: 0 K/UL
NRBC BLD-RTO: 0 /100 WBC
PLATELET # BLD AUTO: 235 K/UL (ref 164–446)
PMV BLD AUTO: 10.4 FL (ref 9–12.9)
POTASSIUM SERPL-SCNC: 4.1 MMOL/L (ref 3.6–5.5)
PROT SERPL-MCNC: 7.2 G/DL (ref 6–8.2)
RBC # BLD AUTO: 4.46 M/UL (ref 4.2–5.4)
SODIUM SERPL-SCNC: 136 MMOL/L (ref 135–145)
TRIGL SERPL-MCNC: 71 MG/DL (ref 0–149)
TSH SERPL DL<=0.005 MIU/L-ACNC: 3.46 UIU/ML (ref 0.38–5.33)
WBC # BLD AUTO: 4.6 K/UL (ref 4.8–10.8)

## 2021-01-22 PROCEDURE — 80061 LIPID PANEL: CPT

## 2021-01-22 PROCEDURE — 36415 COLL VENOUS BLD VENIPUNCTURE: CPT

## 2021-01-22 PROCEDURE — 82306 VITAMIN D 25 HYDROXY: CPT

## 2021-01-22 PROCEDURE — 85025 COMPLETE CBC W/AUTO DIFF WBC: CPT

## 2021-01-22 PROCEDURE — 80053 COMPREHEN METABOLIC PANEL: CPT

## 2021-01-22 PROCEDURE — 86803 HEPATITIS C AB TEST: CPT

## 2021-01-22 PROCEDURE — 84443 ASSAY THYROID STIM HORMONE: CPT

## 2021-01-22 PROCEDURE — 83036 HEMOGLOBIN GLYCOSYLATED A1C: CPT

## 2021-04-07 ENCOUNTER — IMMUNIZATION (OUTPATIENT)
Dept: FAMILY PLANNING/WOMEN'S HEALTH CLINIC | Facility: IMMUNIZATION CENTER | Age: 66
End: 2021-04-07
Payer: COMMERCIAL

## 2021-04-07 DIAGNOSIS — Z23 ENCOUNTER FOR VACCINATION: Primary | ICD-10-CM

## 2021-04-07 PROCEDURE — 0001A PFIZER SARS-COV-2 VACCINE: CPT

## 2021-04-07 PROCEDURE — 91300 PFIZER SARS-COV-2 VACCINE: CPT

## 2021-04-30 ENCOUNTER — IMMUNIZATION (OUTPATIENT)
Dept: FAMILY PLANNING/WOMEN'S HEALTH CLINIC | Facility: IMMUNIZATION CENTER | Age: 66
End: 2021-04-30
Payer: COMMERCIAL

## 2021-04-30 DIAGNOSIS — Z23 ENCOUNTER FOR VACCINATION: Primary | ICD-10-CM

## 2021-04-30 PROCEDURE — 91300 PFIZER SARS-COV-2 VACCINE: CPT

## 2021-04-30 PROCEDURE — 0002A PFIZER SARS-COV-2 VACCINE: CPT

## 2021-10-15 ENCOUNTER — OFFICE VISIT (OUTPATIENT)
Dept: MEDICAL GROUP | Facility: IMAGING CENTER | Age: 66
End: 2021-10-15
Payer: COMMERCIAL

## 2021-10-15 VITALS
HEART RATE: 64 BPM | SYSTOLIC BLOOD PRESSURE: 132 MMHG | HEIGHT: 61 IN | OXYGEN SATURATION: 98 % | RESPIRATION RATE: 12 BRPM | DIASTOLIC BLOOD PRESSURE: 86 MMHG | BODY MASS INDEX: 26.81 KG/M2 | WEIGHT: 142 LBS | TEMPERATURE: 97.9 F

## 2021-10-15 DIAGNOSIS — Z00.00 WELL ADULT EXAM: ICD-10-CM

## 2021-10-15 DIAGNOSIS — Z23 NEED FOR INFLUENZA VACCINATION: ICD-10-CM

## 2021-10-15 DIAGNOSIS — M25.551 DISCOMFORT OF RIGHT HIP: ICD-10-CM

## 2021-10-15 DIAGNOSIS — E78.00 HYPERCHOLESTEREMIA: ICD-10-CM

## 2021-10-15 DIAGNOSIS — Z86.010 HISTORY OF COLON POLYPS: ICD-10-CM

## 2021-10-15 DIAGNOSIS — R03.0 ELEVATED BP WITHOUT DIAGNOSIS OF HYPERTENSION: ICD-10-CM

## 2021-10-15 DIAGNOSIS — E55.9 VITAMIN D INSUFFICIENCY: ICD-10-CM

## 2021-10-15 DIAGNOSIS — D72.819 LEUKOPENIA, UNSPECIFIED TYPE: ICD-10-CM

## 2021-10-15 PROCEDURE — 90471 IMMUNIZATION ADMIN: CPT | Performed by: FAMILY MEDICINE

## 2021-10-15 PROCEDURE — 90686 IIV4 VACC NO PRSV 0.5 ML IM: CPT | Performed by: FAMILY MEDICINE

## 2021-10-15 PROCEDURE — 99396 PREV VISIT EST AGE 40-64: CPT | Mod: 25 | Performed by: FAMILY MEDICINE

## 2021-10-15 ASSESSMENT — PATIENT HEALTH QUESTIONNAIRE - PHQ9: CLINICAL INTERPRETATION OF PHQ2 SCORE: 0

## 2021-10-15 ASSESSMENT — PAIN SCALES - GENERAL: PAINLEVEL: 5=MODERATE PAIN

## 2021-10-15 ASSESSMENT — FIBROSIS 4 INDEX: FIB4 SCORE: 1.73

## 2021-10-15 NOTE — PROGRESS NOTES
Chief Complaint   Patient presents with   • Annual Exam       HPI:  66 y.o. female for routine well exam.     Hypercholesterolemia-stable on simvastatin 10 mg daily.  Notes she has decreased the dose to 10 mg the past 2 months.  Eats well.  No unusual muscle aches. She is now on coenzyme Q 10.    Her blood pressure at the gynecology office last week was 140/90.  Not sure if she is snoring.  Very occasionally smoked a little in her teens but no regular smoking.  May drink a few glasses of wine during the weekends.    Vitamin D insufficiency-stable on last labs.   decreased wbc on prior labs.     Notes slight discomfort of right lateral superior hip region recently.  Does workout regularly.  No recent injury.     Lifestyle:  Diet: cooks at home, overall healthy.  Exercise/Activities: 6 days a week- elliptical, [place], walks do daily.   Stressors: has some frustrations with  currently. Slight worry.  Social Support: lives with   Work: retired 2 years    Health Maintenance:  Last pap: Notes recently seen by gynecology, Dr. Schmitt.  Need prior lab records.  Immunizations: reviewed  Mammogram: 12/2020  Colonoscopy: History of colon polyps.  Screening up-to-date.  Dexa Scan: 2019, she will send results  Hepatitis C screening: up to date.     Health Maintenance Summary                IMM ZOSTER VACCINES Overdue 3/7/2007     PAP SMEAR Overdue 8/23/2020      Done 8/23/2017 PAP IG (IMAGE GUIDED)     Patient has more history with this topic...    MAMMOGRAM Next Due 12/14/2021      Done 12/14/2020 MA-SCREENING MAMMO BILAT W/IMPLANTS W/AKHIL W/CAD     Patient has more history with this topic...    COLORECTAL CANCER SCREENING Next Due 1/3/2024     IMM DTaP/Tdap/Td Vaccine Next Due 10/1/2030      Done 10/1/2020 Imm Admin: Tdap Vaccine          Immunization History   Administered Date(s) Administered   • Influenza Vaccine Quad Inj (Pf) 10/01/2020   • Pfizer SARS-CoV-2 Vaccine 04/07/2021, 04/30/2021   • Tdap Vaccine  10/01/2020       Review of Systems:  Constitutional: Negative for fever, chills and malaise/fatigue.   HENT: Negative for congestion, sore throat, or swallowing issues.   Eyes: Negative for pain or vision changes.   Respiratory: Negative for cough and shortness of breath.    Cardiovascular: Negative for leg swelling. No chest pain.   Gastrointestinal: Negative for nausea, vomiting, abdominal pain and diarrhea.   Genitourinary: Negative for dysuria and hematuria.   Skin: Negative for rash.   Neurological: Negative for dizziness, focal weakness and headaches.   Endo/Heme/Allergies: Does not bruise/bleed easily.   Psychiatric/Behavioral: Negative for depression.  The patient is not nervous/anxious.        Current Outpatient Medications:   •  Multiple Vitamin (MULTIVITAMIN ADULT PO), Take  by mouth., Disp: , Rfl:   •  simvastatin (ZOCOR) 20 MG Tab, Take 1 Tablet by mouth every day. IN THE EVENING (Patient taking differently: Take 10 mg by mouth every day. IN THE EVENING), Disp: 90 Tablet, Rfl: 0  •  VITAMIN E PO, Take  by mouth., Disp: , Rfl:   •  Cholecalciferol (VITAMIN D PO), Take 1 Tab by mouth every day., Disp: , Rfl:   •  Ascorbic Acid (VITAMIN C PO), Take 1 Tab by mouth every day., Disp: , Rfl:   •  Cyanocobalamin (VITAMIN B 12 PO), Take 1 Tab by mouth every day., Disp: , Rfl:   •  CALCIUM PO, Take 1 Tab by mouth every day., Disp: , Rfl:   •  Glucosamine-Chondroitin (GLUCOSAMINE CHONDR COMPLEX PO), Take 1 Tab by mouth every day., Disp: , Rfl:   •  POTASSIUM PO, Take 1 Tab by mouth every day., Disp: , Rfl:     No Known Allergies    Patient Active Problem List   Diagnosis   • Hypercholesteremia   • Vitamin D insufficiency   • History of colon polyps       Past Medical History:   Diagnosis Date   • Complete tear of right rotator cuff 10/5/2017   • High cholesterol    • Hyperlipidemia    • Snoring        Past Surgical History:   Procedure Laterality Date   • SHOULDER DECOMPRESSION ARTHROSCOPIC Right 10/5/2017     "Procedure: SHOULDER DECOMPRESSION ARTHROSCOPIC SUBACROMIAL;  Surgeon: Mimi Wood M.D.;  Location: SURGERY [facility];  Service: Orthopedics   • ARTHROSCOPIC LABRAL DEBRIDEMENT Right 10/5/2017    Procedure: ARTHROSCOPIC LABRAL DEBRIDEMENT;  Surgeon: Mimi Wood M.D.;  Location: SURGERY [facility];  Service: Orthopedics   • SHOULDER ARTHROSCOPY W/ ROTATOR CUFF REPAIR Right 10/5/2017    Procedure: SHOULDER ARTHROSCOPY W/ ROTATOR CUFF REPAIR;  Surgeon: Mimi Wood M.D.;  Location: SURGERY [facility];  Service: Orthopedics   • MD BREAST AUGMENTATION WITH IMPLANT           Family History   Problem Relation Age of Onset   • Cancer Mother         breast   • Cancer Paternal Grandmother         breast   • Diabetes Maternal Grandfather        Social History     Tobacco Use   • Smoking status: Former Smoker     Types: Cigarettes     Quit date: 1971     Years since quittin.8   • Smokeless tobacco: Never Used   • Tobacco comment: on and off as teen   Vaping Use   • Vaping Use: Never used   Substance Use Topics   • Alcohol use: Yes     Alcohol/week: 1.8 oz     Types: 3 Standard drinks or equivalent per week     Comment: 3 per week   • Drug use: Not Currently       PHYSICAL EXAM:  /86   Pulse 64   Temp 36.6 °C (97.9 °F)   Resp 12   Ht 1.549 m (5' 1\")   Wt 64.4 kg (142 lb)   SpO2 98%   BMI 26.83 kg/m²   Constitutional: She appears well-developed and well-nourished. She appears not diaphoretic. No distress.   HENT: Right Ear: External ear normal. Left Ear: External ear normal. Tympanic membranes clear and intact.   Nose: Nose normal.   Mouth/Throat: Oropharynx is clear and moist. No oropharyngeal exudate.     Eyes: Conjunctivae and extraocular motions are normal. Pupils are equal, round, and reactive to light. No scleral icterus.   Neck: Normal range of motion. Neck supple. No thyromegaly present.   Cardiovascular: Normal rate, regular rhythm, normal heart sounds and " intact distal pulses.  Exam reveals no gallop and no friction rub.  No murmur heard. No carotid bruits.   Pulmonary/Chest: Effort normal and breath sounds normal. No respiratory distress. She has no wheezes. She has no rales.   Abdominal: Soft. Bowel sounds are normal. She exhibits no distension and no mass. No tenderness. She has no rebound and no guarding.   Lymphadenopathy:  She has no cervical adenopathy.   Neurological: She is alert. She has normal reflexes. No cranial nerve deficit. She exhibits normal muscle tone.   Skin: Skin is warm and dry. No rash noted. She is not diaphoretic. No erythema.   Psychiatric: She has a normal mood and affect. Her behavior is normal.   Musculoskeletal: She exhibits no edema. Full strength throughout. 2+ DTR throughout. Minimal TTP of right superior lateral hip region of soft tissue.. Normal ROM of hips, no pain with IR or axial load.       ASSESSMENT/PLAN:    This is a 66 y.o. female for routine well exam.     1. Well adult exam     2. Hypercholesteremia  Comp Metabolic Panel    Lipid Profile    TSH WITH REFLEX TO FT4   3. Elevated BP without diagnosis of hypertension     4. Vitamin D insufficiency  VITAMIN D,25 HYDROXY   5. Leukopenia, unspecified type  CBC WITH DIFFERENTIAL   6. Discomfort of right hip     7. History of colon polyps     8. Need for influenza vaccination  INFLUENZA VACCINE QUAD INJ (PF)   -Well adult exam-recommend continue healthy diet and routine exercise activities.  -Hypercholesterolemia-she is on simvastatin 10 mg daily.  Will repeat lab work in near future.  Recommend low-cholesterol, high-fiber diet and routine exercise.  -Elevated blood pressure -without diagnosis of hypertension.  Patient to monitor blood pressure.  She will obtain home cuff.  Keep log.  -Vitamin D insufficiency-continue to monitor labs.  -Leukopenia-mild.  We will continue to monitor labs.  -Discomfort of right hip-mild, recent symptoms.  Minimal findings on exam.  Recommend  continue to monitor at this time.  Consider modify activities.    Follow-up if no further improvements in 4 to 6 weeks.  -History of colon [id number]-date on colonoscopy.  Routine follow-up with GI for routine colonoscopy in future.    Return in about 2 months (around 12/15/2021).    This medical record contains text that has been entered with the assistance of computer voice recognition and dictation software.  Therefore, it may contain unintended errors in text, spelling, punctuation, or grammar.

## 2021-10-20 ENCOUNTER — HOSPITAL ENCOUNTER (EMERGENCY)
Facility: MEDICAL CENTER | Age: 66
End: 2021-10-20
Attending: EMERGENCY MEDICINE | Admitting: EMERGENCY MEDICINE
Payer: COMMERCIAL

## 2021-10-20 ENCOUNTER — APPOINTMENT (OUTPATIENT)
Dept: RADIOLOGY | Facility: MEDICAL CENTER | Age: 66
End: 2021-10-20
Attending: EMERGENCY MEDICINE
Payer: COMMERCIAL

## 2021-10-20 VITALS
HEIGHT: 61 IN | DIASTOLIC BLOOD PRESSURE: 87 MMHG | HEART RATE: 60 BPM | WEIGHT: 142 LBS | BODY MASS INDEX: 26.81 KG/M2 | SYSTOLIC BLOOD PRESSURE: 186 MMHG | TEMPERATURE: 97 F | OXYGEN SATURATION: 94 % | RESPIRATION RATE: 14 BRPM

## 2021-10-20 DIAGNOSIS — J18.9 PNEUMONIA DUE TO INFECTIOUS ORGANISM, UNSPECIFIED LATERALITY, UNSPECIFIED PART OF LUNG: ICD-10-CM

## 2021-10-20 LAB
ALBUMIN SERPL BCP-MCNC: 4.8 G/DL (ref 3.2–4.9)
ALBUMIN/GLOB SERPL: 1.7 G/DL
ALP SERPL-CCNC: 63 U/L (ref 30–99)
ALT SERPL-CCNC: 13 U/L (ref 2–50)
ANION GAP SERPL CALC-SCNC: 11 MMOL/L (ref 7–16)
AST SERPL-CCNC: 23 U/L (ref 12–45)
BASOPHILS # BLD AUTO: 0.5 % (ref 0–1.8)
BASOPHILS # BLD: 0.03 K/UL (ref 0–0.12)
BILIRUB SERPL-MCNC: 0.3 MG/DL (ref 0.1–1.5)
BUN SERPL-MCNC: 12 MG/DL (ref 8–22)
CALCIUM SERPL-MCNC: 9.7 MG/DL (ref 8.5–10.5)
CHLORIDE SERPL-SCNC: 103 MMOL/L (ref 96–112)
CO2 SERPL-SCNC: 27 MMOL/L (ref 20–33)
CREAT SERPL-MCNC: 0.77 MG/DL (ref 0.5–1.4)
D DIMER PPP IA.FEU-MCNC: 0.31 UG/ML (FEU) (ref 0–0.5)
EKG IMPRESSION: NORMAL
EOSINOPHIL # BLD AUTO: 0.09 K/UL (ref 0–0.51)
EOSINOPHIL NFR BLD: 1.5 % (ref 0–6.9)
ERYTHROCYTE [DISTWIDTH] IN BLOOD BY AUTOMATED COUNT: 47.5 FL (ref 35.9–50)
GLOBULIN SER CALC-MCNC: 2.9 G/DL (ref 1.9–3.5)
GLUCOSE SERPL-MCNC: 105 MG/DL (ref 65–99)
HCT VFR BLD AUTO: 43.2 % (ref 37–47)
HGB BLD-MCNC: 14.2 G/DL (ref 12–16)
IMM GRANULOCYTES # BLD AUTO: 0.01 K/UL (ref 0–0.11)
IMM GRANULOCYTES NFR BLD AUTO: 0.2 % (ref 0–0.9)
LYMPHOCYTES # BLD AUTO: 2.23 K/UL (ref 1–4.8)
LYMPHOCYTES NFR BLD: 36.1 % (ref 22–41)
MCH RBC QN AUTO: 30.3 PG (ref 27–33)
MCHC RBC AUTO-ENTMCNC: 32.9 G/DL (ref 33.6–35)
MCV RBC AUTO: 92.3 FL (ref 81.4–97.8)
MONOCYTES # BLD AUTO: 0.33 K/UL (ref 0–0.85)
MONOCYTES NFR BLD AUTO: 5.3 % (ref 0–13.4)
NEUTROPHILS # BLD AUTO: 3.48 K/UL (ref 2–7.15)
NEUTROPHILS NFR BLD: 56.4 % (ref 44–72)
NRBC # BLD AUTO: 0 K/UL
NRBC BLD-RTO: 0 /100 WBC
PLATELET # BLD AUTO: 251 K/UL (ref 164–446)
PMV BLD AUTO: 9.8 FL (ref 9–12.9)
POTASSIUM SERPL-SCNC: 3.7 MMOL/L (ref 3.6–5.5)
PROT SERPL-MCNC: 7.7 G/DL (ref 6–8.2)
RBC # BLD AUTO: 4.68 M/UL (ref 4.2–5.4)
SODIUM SERPL-SCNC: 141 MMOL/L (ref 135–145)
TROPONIN T SERPL-MCNC: 6 NG/L (ref 6–19)
TROPONIN T SERPL-MCNC: <6 NG/L (ref 6–19)
WBC # BLD AUTO: 6.2 K/UL (ref 4.8–10.8)

## 2021-10-20 PROCEDURE — 80053 COMPREHEN METABOLIC PANEL: CPT

## 2021-10-20 PROCEDURE — 99284 EMERGENCY DEPT VISIT MOD MDM: CPT

## 2021-10-20 PROCEDURE — 93005 ELECTROCARDIOGRAM TRACING: CPT

## 2021-10-20 PROCEDURE — 71045 X-RAY EXAM CHEST 1 VIEW: CPT

## 2021-10-20 PROCEDURE — 700102 HCHG RX REV CODE 250 W/ 637 OVERRIDE(OP): Performed by: EMERGENCY MEDICINE

## 2021-10-20 PROCEDURE — 84484 ASSAY OF TROPONIN QUANT: CPT

## 2021-10-20 PROCEDURE — 85379 FIBRIN DEGRADATION QUANT: CPT

## 2021-10-20 PROCEDURE — 85025 COMPLETE CBC W/AUTO DIFF WBC: CPT

## 2021-10-20 PROCEDURE — 93005 ELECTROCARDIOGRAM TRACING: CPT | Performed by: EMERGENCY MEDICINE

## 2021-10-20 PROCEDURE — A9270 NON-COVERED ITEM OR SERVICE: HCPCS | Performed by: EMERGENCY MEDICINE

## 2021-10-20 RX ORDER — DOXYCYCLINE 100 MG/1
100 TABLET ORAL ONCE
Status: COMPLETED | OUTPATIENT
Start: 2021-10-20 | End: 2021-10-20

## 2021-10-20 RX ORDER — DOXYCYCLINE 100 MG/1
100 CAPSULE ORAL 2 TIMES DAILY
Qty: 20 CAPSULE | Refills: 0 | Status: SHIPPED | OUTPATIENT
Start: 2021-10-20 | End: 2021-11-17

## 2021-10-20 RX ADMIN — DOXYCYCLINE 100 MG: 100 TABLET, FILM COATED ORAL at 23:24

## 2021-10-20 ASSESSMENT — FIBROSIS 4 INDEX: FIB4 SCORE: 1.73

## 2021-10-20 NOTE — ED TRIAGE NOTES
"Cindi Cooper  66 y.o. female  Chief Complaint   Patient presents with   • Palpitations     Heart beating faster than normal and pt reporting that breathing is harder than normal, \"heavier to breath\"       Vitals:    10/20/21 1457   BP: 156/86   Pulse: 97   Resp: 18   Temp: 36.1 °C (97 °F)   SpO2: 98%        Patient ambulatory into triage for the complaint above.    Patient is in stable condition at time of triage, has been educated on the triage process and placed back into the ED lobby at this time - pt has verbalized understanding of this process.     RN encouraged patient to alert staff at front for any changes that may occur while they are waiting to be evaluated by an ERP.     Of note, this RN and patient are in proper PPE and has a mask in place at all times during this encounter.     Protocol ordered. EKG done.    Past Medical History:   Diagnosis Date   • Complete tear of right rotator cuff 10/5/2017   • High cholesterol    • Hyperlipidemia    • Snoring         "

## 2021-10-21 NOTE — ED PROVIDER NOTES
"ED Provider Note    CHIEF COMPLAINT  Chief Complaint   Patient presents with   • Palpitations     Heart beating faster than normal and pt reporting that breathing is harder than normal, \"heavier to breath\"       HPI  Cindi Cooper is a 66 y.o. female here for evaluation of heart palpitations.  Patient states that she began having these over the last week, and states that she has had them approximately 3-4 times throughout the week, and then had them today more consistently.  She denies any chest pain, but states that she does have some worry because of the \"discomfort.\"  She has no back issues or radiation of the discomfort to her back, she has no abdominal pain, headache, or vomiting.  She has no fever chills.  She did not take anything prior to arrival for the discomfort.  Nothing seems to leave exacerbate her symptoms.    ROS;  Please see HPI  O/W negative     PAST MEDICAL HISTORY   has a past medical history of Complete tear of right rotator cuff (10/5/2017), High cholesterol, Hyperlipidemia, and Snoring.    SOCIAL HISTORY  Social History     Tobacco Use   • Smoking status: Former Smoker     Types: Cigarettes     Quit date: 1971     Years since quittin.8   • Smokeless tobacco: Never Used   • Tobacco comment: on and off as teen   Vaping Use   • Vaping Use: Never used   Substance and Sexual Activity   • Alcohol use: Yes     Alcohol/week: 1.8 oz     Types: 3 Standard drinks or equivalent per week     Comment: 3 per week   • Drug use: Not Currently   • Sexual activity: Not Currently     Partners: Male       SURGICAL HISTORY   has a past surgical history that includes breast augmentation with implant (  ); shoulder decompression arthroscopic (Right, 10/5/2017); arthroscopic labral debridement (Right, 10/5/2017); and shoulder arthroscopy w/ rotator cuff repair (Right, 10/5/2017).    CURRENT MEDICATIONS  Home Medications     Reviewed by Jamari Lopez R.N. (Registered Nurse) on 10/20/21 at 1506 " " Med List Status: Not Addressed   Medication Last Dose Status   Ascorbic Acid (VITAMIN C PO)  Active   CALCIUM PO  Active   Cholecalciferol (VITAMIN D PO)  Active   Cyanocobalamin (VITAMIN B 12 PO)  Active   Glucosamine-Chondroitin (GLUCOSAMINE CHONDR COMPLEX PO)  Active   Multiple Vitamin (MULTIVITAMIN ADULT PO)  Active   POTASSIUM PO  Active   simvastatin (ZOCOR) 20 MG Tab  Active   VITAMIN E PO  Active                ALLERGIES  No Known Allergies    REVIEW OF SYSTEMS  See HPI for further details. Review of systems as above, otherwise all other systems are negative.     PHYSICAL EXAM  VITAL SIGNS: BP (!) 186/87   Pulse 60   Temp 36.1 °C (97 °F) (Temporal)   Resp 14   Ht 1.549 m (5' 1\")   Wt 64.4 kg (142 lb)   SpO2 94%   BMI 26.83 kg/m²     Constitutional: Well developed, well nourished. No acute distress.  HEENT: Normocephalic, atraumatic. MMM  Neck: Supple, Full range of motion   Chest/Pulmonary:  No respiratory distress.  Equal expansion   Musculoskeletal: No deformity, no edema, neurovascular intact.   Abdomen; soft, nontender, no guarding, no peritoneal signs  Neuro: Clear speech, appropriate, cooperative, cranial nerves II-XII grossly intact.  Psych: Normal mood and affect  Skin; warm and dry, no petechial rash      Results for orders placed or performed during the hospital encounter of 10/20/21   CBC with Differential   Result Value Ref Range    WBC 6.2 4.8 - 10.8 K/uL    RBC 4.68 4.20 - 5.40 M/uL    Hemoglobin 14.2 12.0 - 16.0 g/dL    Hematocrit 43.2 37.0 - 47.0 %    MCV 92.3 81.4 - 97.8 fL    MCH 30.3 27.0 - 33.0 pg    MCHC 32.9 (L) 33.6 - 35.0 g/dL    RDW 47.5 35.9 - 50.0 fL    Platelet Count 251 164 - 446 K/uL    MPV 9.8 9.0 - 12.9 fL    Neutrophils-Polys 56.40 44.00 - 72.00 %    Lymphocytes 36.10 22.00 - 41.00 %    Monocytes 5.30 0.00 - 13.40 %    Eosinophils 1.50 0.00 - 6.90 %    Basophils 0.50 0.00 - 1.80 %    Immature Granulocytes 0.20 0.00 - 0.90 %    Nucleated RBC 0.00 /100 WBC    " Neutrophils (Absolute) 3.48 2.00 - 7.15 K/uL    Lymphs (Absolute) 2.23 1.00 - 4.80 K/uL    Monos (Absolute) 0.33 0.00 - 0.85 K/uL    Eos (Absolute) 0.09 0.00 - 0.51 K/uL    Baso (Absolute) 0.03 0.00 - 0.12 K/uL    Immature Granulocytes (abs) 0.01 0.00 - 0.11 K/uL    NRBC (Absolute) 0.00 K/uL   Complete Metabolic Panel (CMP)   Result Value Ref Range    Sodium 141 135 - 145 mmol/L    Potassium 3.7 3.6 - 5.5 mmol/L    Chloride 103 96 - 112 mmol/L    Co2 27 20 - 33 mmol/L    Anion Gap 11.0 7.0 - 16.0    Glucose 105 (H) 65 - 99 mg/dL    Bun 12 8 - 22 mg/dL    Creatinine 0.77 0.50 - 1.40 mg/dL    Calcium 9.7 8.5 - 10.5 mg/dL    AST(SGOT) 23 12 - 45 U/L    ALT(SGPT) 13 2 - 50 U/L    Alkaline Phosphatase 63 30 - 99 U/L    Total Bilirubin 0.3 0.1 - 1.5 mg/dL    Albumin 4.8 3.2 - 4.9 g/dL    Total Protein 7.7 6.0 - 8.2 g/dL    Globulin 2.9 1.9 - 3.5 g/dL    A-G Ratio 1.7 g/dL   Troponin   Result Value Ref Range    Troponin T 6 6 - 19 ng/L   ESTIMATED GFR   Result Value Ref Range    GFR If African American >60 >60 mL/min/1.73 m 2    GFR If Non African American >60 >60 mL/min/1.73 m 2   TROPONIN   Result Value Ref Range    Troponin T <6 6 - 19 ng/L   D-DIMER   Result Value Ref Range    D-Dimer Screen 0.31 0.00 - 0.50 ug/mL (FEU)   EKG (NOW)   Result Value Ref Range    Report       [facility] Emergency Dept.    Test Date:  2021-10-20  Pt Name:    SAVITA MATHEW              Department: ER  MRN:        [id number]                      Room:  Gender:     Female                       Technician: [id number]  :        [id number]                   Requested By:ER TRIAGE PROTOCOL  Order #:    [id number]                    Yolanda MD:    Measurements  Intervals                                Axis  Rate:       70                           P:          46  FL:         148                          QRS:        -13  QRSD:       78                           T:          26  QT:         428  QTc:        462    Interpretive  Statements  SINUS RHYTHM  PROBABLE LEFT ATRIAL ABNORMALITY  Compared to ECG 09/19/2017 15:50:33  Sinus bradycardia no longer present       DX-CHEST-PORTABLE (1 VIEW)   Final Result      1.  Right basilar atelectasis or consolidation. Follow-up is recommended as a mass is not excluded.   2.  Blunting of the right costophrenic angle may be related to a trace right pleural effusion.   3.  Cardiomegaly.        Ekg;  nsr 70.  No st elevation, no st depression.  qtc 462.  Compared to ekg from 9/19/17.        PROCEDURES     MEDICAL RECORD  I have reviewed patient's medical record and pertinent results are listed.    COURSE & MEDICAL DECISION MAKING  I have reviewed any medical record information, laboratory studies and radiographic results as noted above.    The pt is nontoxic appearing, has two sets of negative trops, a negative dimer, unremarkable ekg, and a cxr showing pneumonia.  She will be treated, and will follow up with her doctor in 2-3 days.  She understands the importance of having a ct of the chest, if her symptoms on abx are not improving in a few days.     Heart score of 1.    I you have had any blood pressure issues while here in the emergency department, please see your doctor for a further evaluation or work up.    Differential diagnoses include but not limited to: pneumonia, mi, pe, ptx.    This patient presents with pneumonia  .  At this time, I have counseled the patient/family regarding their medications, pain control, and follow up.  They will continue their medications, if any, as prescribed.  They will return immediately for any worsening symptoms and/or any other medical concerns.  They will see their doctor, or contact the doctor provided, in 1-2 days for follow up.       FINAL IMPRESSION  1. Pneumonia due to infectious organism, unspecified laterality, unspecified part of lung  doxycycline (MONODOX) 100 MG capsule           Electronically signed by: Janes Dean D.O., 10/20/2021 9:15  PM

## 2021-10-21 NOTE — DISCHARGE INSTRUCTIONS
Please be sure to take all antibiotics as prescribed.    See your doctor in 1-2 days.  You may need a CT of the chest if your symptoms are not improving in 4 days, after antibiotic use.    You have to rule out a worsening pneumonia or mass in the chest.   Have this done before you travel to [place].

## 2021-10-21 NOTE — ED NOTES
Discharge teaching and paperwork provided. All questions/concerns answered. VSS, assessment stable and PIV removed. Given information regarding prescription. Patient discharged to the care of spouse and ambulated out of the ED with steady gait.

## 2021-10-22 ENCOUNTER — TELEPHONE (OUTPATIENT)
Dept: MEDICAL GROUP | Facility: IMAGING CENTER | Age: 66
End: 2021-10-22

## 2021-10-22 ENCOUNTER — HOSPITAL ENCOUNTER (OUTPATIENT)
Dept: LAB | Facility: MEDICAL CENTER | Age: 66
End: 2021-10-22
Attending: FAMILY MEDICINE
Payer: COMMERCIAL

## 2021-10-22 ENCOUNTER — OFFICE VISIT (OUTPATIENT)
Dept: MEDICAL GROUP | Facility: IMAGING CENTER | Age: 66
End: 2021-10-22
Payer: COMMERCIAL

## 2021-10-22 ENCOUNTER — HOSPITAL ENCOUNTER (OUTPATIENT)
Dept: RADIOLOGY | Facility: MEDICAL CENTER | Age: 66
End: 2021-10-22
Attending: FAMILY MEDICINE
Payer: COMMERCIAL

## 2021-10-22 VITALS
BODY MASS INDEX: 27 KG/M2 | RESPIRATION RATE: 12 BRPM | WEIGHT: 143 LBS | HEIGHT: 61 IN | DIASTOLIC BLOOD PRESSURE: 88 MMHG | TEMPERATURE: 97.6 F | HEART RATE: 58 BPM | OXYGEN SATURATION: 98 % | SYSTOLIC BLOOD PRESSURE: 138 MMHG

## 2021-10-22 DIAGNOSIS — R93.89 ABNORMAL CXR: ICD-10-CM

## 2021-10-22 LAB — NT-PROBNP SERPL IA-MCNC: 73 PG/ML (ref 0–125)

## 2021-10-22 PROCEDURE — 99213 OFFICE O/P EST LOW 20 MIN: CPT | Performed by: FAMILY MEDICINE

## 2021-10-22 PROCEDURE — 36415 COLL VENOUS BLD VENIPUNCTURE: CPT

## 2021-10-22 PROCEDURE — 83880 ASSAY OF NATRIURETIC PEPTIDE: CPT

## 2021-10-22 PROCEDURE — 71046 X-RAY EXAM CHEST 2 VIEWS: CPT

## 2021-10-22 ASSESSMENT — FIBROSIS 4 INDEX: FIB4 SCORE: 1.63

## 2021-10-22 ASSESSMENT — PAIN SCALES - GENERAL: PAINLEVEL: NO PAIN

## 2021-10-22 NOTE — PROGRESS NOTES
"  SUBJECTIVE:    Chief Complaint   Patient presents with   • Hospital Follow-up       HPI:     Cindi Cooper is a 66 y.o. female with hypercholesterolemia, history of colon polyps and vitamin D insufficiency here for ER follow-up.    Went to ER 10/20/2021.   Was doing things around the house as she is planning for a trip to [place] next week.  Planning to leave next week, Wednesday.  Was eating lunch and chest felt like her breathing was getting a little hard.   Felt possibly she has been overreacting to it.   Blood pressure in morning 140/80s.   Just felt \"not right, \" thus concerned about heart went to the ER for evaluation.  Chest x-ray noted mild right pleural effusion and consolidation versus atelectasis, cardiomegaly also noted.  Labs were otherwise unremarkable for symptoms on CBC and CMP.  Troponin and D-dimer were unremarkable.  No cough.  No fevers or chills.  Has normal energy.  She has been on her antibiotics-doxycycline prescribed in ER due to abnormal CXR findings.  Does feel her breathing is better.  No cough symptoms. States she is currently feeling fine.  Blood pressure has been 138/86 and also noted 116/81.  She will be traveling on November 4.      ROS:  No recent fevers or chills. No nausea or vomiting. No diarrhea. No chest pains or shortness of breath. No lower extremity edema.    Current Outpatient Medications on File Prior to Visit   Medication Sig Dispense Refill   • Coenzyme Q10 (COQ10 PO) Take  by mouth.     • doxycycline (MONODOX) 100 MG capsule Take 1 Capsule by mouth 2 times a day. 20 Capsule 0   • Multiple Vitamin (MULTIVITAMIN ADULT PO) Take  by mouth.     • simvastatin (ZOCOR) 20 MG Tab Take 1 Tablet by mouth every day. IN THE EVENING (Patient taking differently: Take 10 mg by mouth every day. IN THE EVENING) 90 Tablet 0   • VITAMIN E PO Take  by mouth.     • Cholecalciferol (VITAMIN D PO) Take 1 Tab by mouth every day.     • Ascorbic Acid (VITAMIN C PO) Take 1 Tab by mouth every " "day.     • Cyanocobalamin (VITAMIN B 12 PO) Take 1 Tab by mouth every day.     • CALCIUM PO Take 1 Tab by mouth every day.     • Glucosamine-Chondroitin (GLUCOSAMINE CHONDR COMPLEX PO) Take 1 Tab by mouth every day.     • POTASSIUM PO Take 1 Tab by mouth every day.       No current facility-administered medications on file prior to visit.       No Known Allergies    Patient Active Problem List    Diagnosis Date Noted   • History of colon polyps 11/02/2018   • Vitamin D insufficiency 03/30/2016   • Hypercholesteremia 07/24/2015       Past Medical History:   Diagnosis Date   • Complete tear of right rotator cuff 10/5/2017   • High cholesterol    • Hyperlipidemia    • Snoring        OBJECTIVE:   /88   Pulse (!) 58   Temp 36.4 °C (97.6 °F)   Resp 12   Ht 1.549 m (5' 1\")   Wt 64.9 kg (143 lb)   SpO2 98%   BMI 27.02 kg/m²   General: Well-developed well-nourished female, no acute distress  Neck: supple, no lymphadenopathy- cervical or supraclavicular, no thyromegaly  Cardiovascular: regular rate and rhythm, no murmurs, gallops, rubs  Lungs: clear to auscultation bilaterally, no wheezes, crackles, or rhonchi  Abdomen: +bowel sounds, soft, nontender, nondistended, no rebound, no guarding, no hepatosplenomegaly  Extremities: no cyanosis, clubbing, edema  Skin: Warm and dry  Psych: appropriate mood and affect    Narrative & Impression     10/20/2021 3:32 PM     HISTORY/REASON FOR EXAM:  Chest Pain.        TECHNIQUE/EXAM DESCRIPTION AND NUMBER OF VIEWS:  Single portable view of the chest.     COMPARISON: None     FINDINGS:     The cardiomediastinal silhouette is enlarged. There is right bibasilar atelectasis or consolidation. There is blunting of the right costophrenic angle. No pneumothorax is seen.     IMPRESSION:     1.  Right basilar atelectasis or consolidation. Follow-up is recommended as a mass is not excluded.  2.  Blunting of the right costophrenic angle may be related to a trace right pleural " effusion.  3.  Cardiomegaly.       Ref. Range 10/20/2021 15:57 10/20/2021 21:43   WBC Latest Ref Range: 4.8 - 10.8 K/uL 6.2    RBC Latest Ref Range: 4.20 - 5.40 M/uL 4.68    Hemoglobin Latest Ref Range: 12.0 - 16.0 g/dL 14.2    Hematocrit Latest Ref Range: 37.0 - 47.0 % 43.2    MCV Latest Ref Range: 81.4 - 97.8 fL 92.3    MCH Latest Ref Range: 27.0 - 33.0 pg 30.3    MCHC Latest Ref Range: 33.6 - 35.0 g/dL 32.9 (L)    RDW Latest Ref Range: 35.9 - 50.0 fL 47.5    Platelet Count Latest Ref Range: 164 - 446 K/uL 251    MPV Latest Ref Range: 9.0 - 12.9 fL 9.8    Neutrophils-Polys Latest Ref Range: 44.00 - 72.00 % 56.40    Neutrophils (Absolute) Latest Ref Range: 2.00 - 7.15 K/uL 3.48    Lymphocytes Latest Ref Range: 22.00 - 41.00 % 36.10    Lymphs (Absolute) Latest Ref Range: 1.00 - 4.80 K/uL 2.23    Monocytes Latest Ref Range: 0.00 - 13.40 % 5.30    Monos (Absolute) Latest Ref Range: 0.00 - 0.85 K/uL 0.33    Eosinophils Latest Ref Range: 0.00 - 6.90 % 1.50    Eos (Absolute) Latest Ref Range: 0.00 - 0.51 K/uL 0.09    Basophils Latest Ref Range: 0.00 - 1.80 % 0.50    Baso (Absolute) Latest Ref Range: 0.00 - 0.12 K/uL 0.03    Immature Granulocytes Latest Ref Range: 0.00 - 0.90 % 0.20    Immature Granulocytes (abs) Latest Ref Range: 0.00 - 0.11 K/uL 0.01    Nucleated RBC Latest Units: /100 WBC 0.00    NRBC (Absolute) Latest Units: K/uL 0.00    Sodium Latest Ref Range: 135 - 145 mmol/L 141    Potassium Latest Ref Range: 3.6 - 5.5 mmol/L 3.7    Chloride Latest Ref Range: 96 - 112 mmol/L 103    Co2 Latest Ref Range: 20 - 33 mmol/L 27    Anion Gap Latest Ref Range: 7.0 - 16.0  11.0    Glucose Latest Ref Range: 65 - 99 mg/dL 105 (H)    Bun Latest Ref Range: 8 - 22 mg/dL 12    Creatinine Latest Ref Range: 0.50 - 1.40 mg/dL 0.77    GFR If  Latest Ref Range: >60 mL/min/1.73 m 2 >60    GFR If Non  Latest Ref Range: >60 mL/min/1.73 m 2 >60    Calcium Latest Ref Range: 8.5 - 10.5 mg/dL 9.7    AST(SGOT)  Latest Ref Range: 12 - 45 U/L 23    ALT(SGPT) Latest Ref Range: 2 - 50 U/L 13    Alkaline Phosphatase Latest Ref Range: 30 - 99 U/L 63    Total Bilirubin Latest Ref Range: 0.1 - 1.5 mg/dL 0.3    Albumin Latest Ref Range: 3.2 - 4.9 g/dL 4.8    Total Protein Latest Ref Range: 6.0 - 8.2 g/dL 7.7    Globulin Latest Ref Range: 1.9 - 3.5 g/dL 2.9    A-G Ratio Latest Units: g/dL 1.7    Troponin T Latest Ref Range: 6 - 19 ng/L 6 <6   D-Dimer Screen Latest Ref Range: 0.00 - 0.50 ug/mL (FEU) 0.31      ASSESSMENT/PLAN:    66 y.o.female with hypercholesterolemia, colon polyps and vitamin D insufficiency.    1. Abnormal CXR  proBrain Natriuretic Peptide, NT    DX-CHEST-2 VIEWS    EC-ECHOCARDIOGRAM COMPLETE W/O CONT   -Abnormal chest x-ray findings noted.  Otherwise unremarkable labs.  Vital stable.  Patient notes she has been feeling fine. No cough, cp, sob or fevers/chills. Exam is otherwise unremarkable.    Discussed further evaluation with lab work and further imaging due to unclear etiology of CXR findings noted. Otherwise continue current medication therapy until further evaluation.     Follow-up will be determined after imaging is complete.    This medical record contains text that has been entered with the assistance of computer voice recognition and dictation software.  Therefore, it may contain unintended errors in text, spelling, punctuation, or grammar.

## 2021-10-23 NOTE — TELEPHONE ENCOUNTER
Reviewed with pt over phone regarding CXR results.  Will return 11/4 from her trip.   Advised we are still awaiting labs results.   Recommend repeat imaging after returning from her trip.

## 2021-10-25 ENCOUNTER — HOSPITAL ENCOUNTER (OUTPATIENT)
Dept: RADIOLOGY | Facility: MEDICAL CENTER | Age: 66
End: 2021-10-25
Attending: FAMILY MEDICINE
Payer: COMMERCIAL

## 2021-10-25 DIAGNOSIS — R93.89 ABNORMAL CXR: ICD-10-CM

## 2021-10-25 PROCEDURE — 71046 X-RAY EXAM CHEST 2 VIEWS: CPT

## 2021-10-27 ENCOUNTER — TELEPHONE (OUTPATIENT)
Dept: MEDICAL GROUP | Facility: IMAGING CENTER | Age: 66
End: 2021-10-27

## 2021-10-27 ENCOUNTER — HOSPITAL ENCOUNTER (OUTPATIENT)
Dept: RADIOLOGY | Facility: MEDICAL CENTER | Age: 66
End: 2021-10-27
Attending: FAMILY MEDICINE
Payer: COMMERCIAL

## 2021-10-27 DIAGNOSIS — R93.89 ABNORMAL CXR: ICD-10-CM

## 2021-10-27 DIAGNOSIS — R91.8 MASS OF RIGHT LUNG: ICD-10-CM

## 2021-10-27 PROCEDURE — 71260 CT THORAX DX C+: CPT

## 2021-10-27 PROCEDURE — 700117 HCHG RX CONTRAST REV CODE 255: Performed by: FAMILY MEDICINE

## 2021-10-27 RX ADMIN — IOHEXOL 75 ML: 350 INJECTION, SOLUTION INTRAVENOUS at 09:15

## 2021-11-08 ENCOUNTER — NON-PROVIDER VISIT (OUTPATIENT)
Dept: MEDICAL GROUP | Facility: IMAGING CENTER | Age: 66
End: 2021-11-08
Payer: COMMERCIAL

## 2021-11-08 DIAGNOSIS — Z23 NEED FOR VACCINATION: ICD-10-CM

## 2021-11-08 PROCEDURE — 90750 HZV VACC RECOMBINANT IM: CPT | Performed by: PHYSICIAN ASSISTANT

## 2021-11-08 PROCEDURE — 90471 IMMUNIZATION ADMIN: CPT | Performed by: PHYSICIAN ASSISTANT

## 2021-11-08 NOTE — PROGRESS NOTES
"Cindi Cooper is a 66 y.o. female here for a non-provider visit for:   SHINGRIX (Shingles)    Reason for immunization: Overdue/Provider Recommended -Due per EPIC  Immunization records indicate need for vaccine: Yes, confirmed with Epic  Minimum interval has been met for this vaccine: Yes  ABN completed: Not Indicated    VIS Dated  08/06/2021 was given to patient: Yes  All IAC Questionnaire questions were answered \"No.\" Patient unsure for question regarding cancer due to recent visits with PCP/Dr. Jin and medical history. Consulted with Makenna Kumar PAC to confirm if still okay to proceed with vaccine. Makenna Kumar PAC confirmed okay to proceed.      Patient tolerated injection and no adverse effects were observed or reported: Yes    Pt scheduled for next dose in series: Patient advised to follow up for second dose between 2-6 months.  "

## 2021-11-10 ENCOUNTER — OFFICE VISIT (OUTPATIENT)
Dept: HEMATOLOGY ONCOLOGY | Facility: MEDICAL CENTER | Age: 66
End: 2021-11-10
Payer: COMMERCIAL

## 2021-11-10 VITALS
RESPIRATION RATE: 16 BRPM | OXYGEN SATURATION: 97 % | HEIGHT: 61 IN | HEART RATE: 76 BPM | BODY MASS INDEX: 27.14 KG/M2 | SYSTOLIC BLOOD PRESSURE: 160 MMHG | TEMPERATURE: 98.7 F | WEIGHT: 143.74 LBS | DIASTOLIC BLOOD PRESSURE: 100 MMHG

## 2021-11-10 DIAGNOSIS — R91.1 LUNG NODULE: ICD-10-CM

## 2021-11-10 PROCEDURE — 99204 OFFICE O/P NEW MOD 45 MIN: CPT | Performed by: NURSE PRACTITIONER

## 2021-11-10 ASSESSMENT — ENCOUNTER SYMPTOMS
DIZZINESS: 0
FEVER: 0
WHEEZING: 0
NERVOUS/ANXIOUS: 0
SORE THROAT: 0
TINGLING: 0
PALPITATIONS: 0
CONSTIPATION: 0
DEPRESSION: 0
INSOMNIA: 0
VOMITING: 0
SHORTNESS OF BREATH: 0
BLOOD IN STOOL: 0
WEIGHT LOSS: 0
DIAPHORESIS: 0
HEADACHES: 0
MYALGIAS: 0
NAUSEA: 0
DIARRHEA: 0
CHILLS: 0
HEMOPTYSIS: 0
COUGH: 0

## 2021-11-10 ASSESSMENT — FIBROSIS 4 INDEX: FIB4 SCORE: 1.63

## 2021-11-10 ASSESSMENT — PAIN SCALES - GENERAL: PAINLEVEL: NO PAIN

## 2021-11-10 NOTE — PROGRESS NOTES
"Ashanti Cooper is a 66 y.o. female who presents as a New Patient (IC NEW/R Lung Mass )            HPI    Patient referred to me, Intake Oncology Coordinator by her PCP Dr. Jin for lung mass.  Patient is unaccompanied for today's visit.    Patient was seen in the ER on 10/20/2021 after an episode of feeling \"odd\" per patient.  She stated that she felt her breathing was heavy, therefore she was seen in the ER.  During her ER visit on 10/20/2021 patient had a chest x-ray completed.  That chest x-ray showed right basilar atelectasis or consolidation as well as blunting of the right costophrenic angle which was concerning for possible pleural effusion.  She also stated that she had cardiac enzyme work-up for any cardiac issues and that was found to be negative.  She was started on antibiotics, doxycycline, and sent home.  She did state that the moment she left the ER she felt back to her normal self but did confirm that she took the antibiotics.  She then followed up with her PCP a few days later.  PCP had ordered chest x-rays which she completed on 10/22/2021 which showed persistent alveolar opacity in the right lower lobe consistent with pneumonia.  Possible right pleural effusion.  Stable minimal interstitial opacity in the right upper lobe.  She then had another chest x-ray completed on 10/25/2021 but that has not been reported out.  Based on these findings patient was sent for CT of the chest with contrast on 10/27/2021.    CT shows a mass at the base of the right hemothorax.  Peripheral calcifications are present.  Differentials include multiple options including pleural tumor such as a mesothelioma.  Lung neoplasm.  Atypical carcinoid.  Hamartoma.  Or congenital mass.  There is also a lesion of the right upper lobe located posteriorly in the mid lung area measuring approximately 5 cm in size, and reading radiologist stated that this can represent a sequela of prior infection or pneumonia.  " Congenital lesion is also possible.  There is no mediastinal adenopathy identified.  There is also some low attenuated lesions within the liver which could be representative of cysts.  I personally viewed the imaging reports discussed above, and the images in detail.  I also discussed the CT results and images with the patient today.    Patient stated she feels fine.  She does not have any other symptoms except that one episode which led her to the ER.  She is very active, she exercises, walks her dog cleans her house.  She did state that she had received her second Covid vaccine in April 30, 2021.  She did receive the flu shot approximately 5 days prior to her ER visit on Wednesday.  She also recently had her shingles shot this previous Monday, 11/8/2021.  Clinically she has no complaints.  She is a little anxious and does have some insomnia just related to the most recent CT results.    Please see past medical and surgical history below.    Patient is a never smoker.    Patient does have a family history of cancer in her mother with breast cancer, paternal grandmother with breast cancer.    No Known Allergies  Current Outpatient Medications on File Prior to Visit   Medication Sig Dispense Refill   • Coenzyme Q10 (COQ10 PO) Take  by mouth.     • doxycycline (MONODOX) 100 MG capsule Take 1 Capsule by mouth 2 times a day. 20 Capsule 0   • Multiple Vitamin (MULTIVITAMIN ADULT PO) Take  by mouth.     • simvastatin (ZOCOR) 20 MG Tab Take 1 Tablet by mouth every day. IN THE EVENING (Patient taking differently: Take 10 mg by mouth every day. IN THE EVENING) 90 Tablet 0   • VITAMIN E PO Take  by mouth.     • Cholecalciferol (VITAMIN D PO) Take 1 Tab by mouth every day.     • Ascorbic Acid (VITAMIN C PO) Take 1 Tab by mouth every day.     • Cyanocobalamin (VITAMIN B 12 PO) Take 1 Tab by mouth every day.     • CALCIUM PO Take 1 Tab by mouth every day.     • Glucosamine-Chondroitin (GLUCOSAMINE CHONDR COMPLEX PO) Take 1 Tab  by mouth every day.     • POTASSIUM PO Take 1 Tab by mouth every day.       No current facility-administered medications on file prior to visit.     Past Medical History:   Diagnosis Date   • Complete tear of right rotator cuff 10/5/2017   • High cholesterol    • Hyperlipidemia    • Snoring      Past Surgical History:   Procedure Laterality Date   • SHOULDER DECOMPRESSION ARTHROSCOPIC Right 10/5/2017    Procedure: SHOULDER DECOMPRESSION ARTHROSCOPIC SUBACROMIAL;  Surgeon: Mimi Wood M.D.;  Location: SURGERY [facility];  Service: Orthopedics   • ARTHROSCOPIC LABRAL DEBRIDEMENT Right 10/5/2017    Procedure: ARTHROSCOPIC LABRAL DEBRIDEMENT;  Surgeon: Mimi Wood M.D.;  Location: SURGERY [facility];  Service: Orthopedics   • SHOULDER ARTHROSCOPY W/ ROTATOR CUFF REPAIR Right 10/5/2017    Procedure: SHOULDER ARTHROSCOPY W/ ROTATOR CUFF REPAIR;  Surgeon: Mimi Wood M.D.;  Location: SURGERY [facility];  Service: Orthopedics   • CT BREAST AUGMENTATION WITH IMPLANT    1998     Family History   Problem Relation Age of Onset   • Cancer Mother         breast   • Cancer Paternal Grandmother         breast   • Diabetes Maternal Grandfather      Social History     Socioeconomic History   • Marital status:      Spouse name: Not on file   • Number of children: Not on file   • Years of education: Not on file   • Highest education level: Not on file   Occupational History   • Occupation:    Tobacco Use   • Smoking status: Never Smoker   • Smokeless tobacco: Never Used   • Tobacco comment: on and off as teen   Vaping Use   • Vaping Use: Never used   Substance and Sexual Activity   • Alcohol use: Yes     Alcohol/week: 1.8 oz     Types: 3 Standard drinks or equivalent per week     Comment: 3 per week   • Drug use: Not Currently   • Sexual activity: Not Currently     Partners: Male   Other Topics Concern   • Not on file   Social History Narrative    , 2 children.      Retired mortgage business.     Social Determinants of Health     Financial Resource Strain:    • Difficulty of Paying Living Expenses: Not on file   Food Insecurity:    • Worried About Running Out of Food in the Last Year: Not on file   • Ran Out of Food in the Last Year: Not on file   Transportation Needs:    • Lack of Transportation (Medical): Not on file   • Lack of Transportation (Non-Medical): Not on file   Physical Activity:    • Days of Exercise per Week: Not on file   • Minutes of Exercise per Session: Not on file   Stress:    • Feeling of Stress : Not on file   Social Connections:    • Frequency of Communication with Friends and Family: Not on file   • Frequency of Social Gatherings with Friends and Family: Not on file   • Attends Nondenominational Services: Not on file   • Active Member of Clubs or Organizations: Not on file   • Attends Club or Organization Meetings: Not on file   • Marital Status: Not on file   Intimate Partner Violence:    • Fear of Current or Ex-Partner: Not on file   • Emotionally Abused: Not on file   • Physically Abused: Not on file   • Sexually Abused: Not on file   Housing Stability:    • Unable to Pay for Housing in the Last Year: Not on file   • Number of Places Lived in the Last Year: Not on file   • Unstable Housing in the Last Year: Not on file           Review of Systems   Constitutional: Negative for chills, diaphoresis, fever, malaise/fatigue and weight loss.   HENT: Negative for congestion and sore throat.    Respiratory: Negative for cough, hemoptysis, shortness of breath and wheezing.    Cardiovascular: Negative for chest pain, palpitations and leg swelling.   Gastrointestinal: Negative for blood in stool, constipation, diarrhea, nausea and vomiting.   Genitourinary: Negative for dysuria and hematuria.   Musculoskeletal: Negative for myalgias.   Skin: Negative for itching and rash.   Neurological: Negative for dizziness, tingling and headaches.   Psychiatric/Behavioral: Negative  "for depression. The patient is not nervous/anxious and does not have insomnia (related to CT results).               Objective     /100   Pulse 76   Temp 37.1 °C (98.7 °F) (Temporal)   Resp 16   Ht 1.549 m (5' 1\")   Wt 65.2 kg (143 lb 11.8 oz)   SpO2 97%   BMI 27.16 kg/m²      Physical Exam  Vitals reviewed.   Constitutional:       General: She is not in acute distress.     Appearance: Normal appearance. She is well-developed. She is not diaphoretic.   HENT:      Head: Normocephalic and atraumatic.   Eyes:      General: No scleral icterus.        Right eye: No discharge.         Left eye: No discharge.      Conjunctiva/sclera: Conjunctivae normal.      Pupils: Pupils are equal, round, and reactive to light.   Neck:      Thyroid: No thyromegaly.   Cardiovascular:      Rate and Rhythm: Normal rate and regular rhythm.      Pulses: Normal pulses.      Heart sounds: Normal heart sounds. No murmur heard.  No friction rub. No gallop.    Pulmonary:      Effort: Pulmonary effort is normal. No respiratory distress.      Breath sounds: Normal breath sounds. No wheezing.   Chest:   Breasts:      Right: No supraclavicular adenopathy.      Left: No supraclavicular adenopathy.       Abdominal:      General: Bowel sounds are normal. There is no distension.      Palpations: Abdomen is soft.      Tenderness: There is no abdominal tenderness.   Musculoskeletal:         General: No swelling or tenderness. Normal range of motion.      Cervical back: Normal range of motion and neck supple.   Lymphadenopathy:      Head:      Right side of head: No submental, submandibular, tonsillar, preauricular, posterior auricular or occipital adenopathy.      Left side of head: No submental, submandibular, tonsillar, preauricular, posterior auricular or occipital adenopathy.      Cervical: No cervical adenopathy.      Right cervical: No superficial, deep or posterior cervical adenopathy.     Left cervical: No superficial, deep or " posterior cervical adenopathy.      Upper Body:      Right upper body: No supraclavicular adenopathy.      Left upper body: No supraclavicular adenopathy.   Skin:     General: Skin is warm and dry.      Coloration: Skin is not pale.      Findings: No erythema or rash.   Neurological:      Mental Status: She is alert and oriented to person, place, and time.   Psychiatric:         Mood and Affect: Mood normal.         Behavior: Behavior normal.               CT-CHEST (THORAX) WITH    Result Date: 10/27/2021  10/27/2021 8:16 AM HISTORY/REASON FOR EXAM:  Right lower lobe pneumonia on chest radiograph. TECHNIQUE/EXAM DESCRIPTION: CT scan of the chest with contrast. Thin-section helical images were obtained from the lung apices through the adrenal glands following the bolus administration of contrast. 75 mL of Omnipaque 350 nonionic contrast was utilized. Low dose optimization technique was utilized for this CT exam including automated exposure control and adjustment of the mA and/or kV according to patient size. COMPARISON:  X-ray 10/22/2021 FINDINGS: Lungs: Irregular shaped mass in the right lower pulmonary lobe is identified measuring 3.7 x 4.2 cm. Some scattered peripheral eccentric calcifications are present within the mass. There is some surrounding linear opacifications. Right upper pulmonary lobe contains a localized area of thin-walled air-filled cysts with scattered centrally located areas of serpiginous and irregular opacification with mainly well-defined geographic borders.. Mediastinum/[facility]: No significant adenopathy. Pleura: No pleural effusion. Cardiac: Heart normal in size without pericardial effusion. Vascular: Unremarkable. Soft tissues: Unremarkable. Bones: No acute or destructive process. Scattered well-defined low-attenuation lesions are noted in the liver ranging in size between 0.3 cm and 1.6 cm with the largest located in the left lobe. The entire liver is not visualized.     1.  Mass at the base  of the right hemithorax is identified which could represent a pulmonary mass versus a pleural mass arising from the diaphragmatic pleura. Peripheral calcifications are present. Differential diagnosis includes pleural tumor such as mesothelioma. Lung neoplasm is in the differential diagnosis. Atypical carcinoid is a possibility. Hamartoma is possible. Congenital mass such as congenital pulmonary airway malformation is also possible. 2.  Lesion in the right upper pulmonary lobe located posteriorly in the mid lung area measures approximately 5 cm in diameter with thin-walled air-filled cysts and some central irregular and fibrotic appearing opacifications. This could represent sequela  of prior infection or pneumonia. Congenital lesion is also possible. 3.  No mediastinal adenopathy is identified. 4.  Low-attenuation lesions are identified in the liver which could represent cysts although necrotic lesions are also possible. Correlation with liver ultrasound is recommended. Fleischner Society pulmonary nodule recommendations: Not Applicable     DX-CHEST-2 VIEWS    Result Date: 10/22/2021  10/22/2021 12:58 PM HISTORY/REASON FOR EXAM:  Cough. Follow-up possible right lower lobe pneumonia. TECHNIQUE/EXAM DESCRIPTION AND NUMBER OF VIEWS: Two views of the chest. COMPARISON:  10/20/2021 FINDINGS: The heart is normal in size. There is persistent alveolar opacity in the right lower lobe consistent with right lower lobe pneumonia. Minimal interstitial opacity in the right upper lobe laterally is unchanged. No left lung consolidation is present. Right costophrenic angle blunting is again noted which may represent a minimal right pleural effusion.     1.  Persistent alveolar opacity in the right lower lobe consistent with right lower lobe pneumonia. 2.  Possible minimal right pleural effusion. 3.  Stable minimal interstitial opacity in the right upper lobe. This could represent interstitial edema, pneumonia, or fibrosis. 4.  Clear  left lung.     DX-CHEST-PORTABLE (1 VIEW)    Result Date: 10/20/2021  10/20/2021 3:32 PM HISTORY/REASON FOR EXAM:  Chest Pain. TECHNIQUE/EXAM DESCRIPTION AND NUMBER OF VIEWS: Single portable view of the chest. COMPARISON: None FINDINGS: The cardiomediastinal silhouette is enlarged. There is right bibasilar atelectasis or consolidation. There is blunting of the right costophrenic angle. No pneumothorax is seen.     1.  Right basilar atelectasis or consolidation. Follow-up is recommended as a mass is not excluded. 2.  Blunting of the right costophrenic angle may be related to a trace right pleural effusion. 3.  Cardiomegaly.           Assessment & Plan        1. Lung nodule  CT-BIOPSY-LUNG/MEDIASTINUM W/GUIDE RIGHT       Patient with a lung nodule noted on recent CT with multiple differentials.  She is extremely low risk as she is a never smoker.  She denies any symptoms.  Discussed with patient can potentially proceed with a PET/CT, however that will not definitively give me the answer as to what this is.  Based on the multiple differentials including benign or malignant findings discussed with patient the recommendation to proceed with a CT-guided biopsy of the lung mass.  Patient did verbalize understanding and is in agreement with the plan.  We will go ahead and proceed with a CT-guided biopsy and have patient follow-up with me in the clinic to review the results and discuss further plan of care at that time.      Please note that this dictation was created using voice recognition software. I have made every reasonable attempt to correct obvious errors, but I expect that there are errors of grammar and possibly content that I did not discover before finalizing the note.

## 2021-11-17 ENCOUNTER — PRE-ADMISSION TESTING (OUTPATIENT)
Dept: ADMISSIONS | Facility: MEDICAL CENTER | Age: 66
End: 2021-11-17
Attending: INTERNAL MEDICINE
Payer: COMMERCIAL

## 2021-11-23 ENCOUNTER — APPOINTMENT (OUTPATIENT)
Dept: ADMISSIONS | Facility: MEDICAL CENTER | Age: 66
End: 2021-11-23
Attending: INTERNAL MEDICINE
Payer: COMMERCIAL

## 2021-11-23 DIAGNOSIS — Z01.812 PRE-OPERATIVE LABORATORY EXAMINATION: ICD-10-CM

## 2021-11-23 LAB
ERYTHROCYTE [DISTWIDTH] IN BLOOD BY AUTOMATED COUNT: 47.4 FL (ref 35.9–50)
HCT VFR BLD AUTO: 40.9 % (ref 37–47)
HGB BLD-MCNC: 13.7 G/DL (ref 12–16)
INR PPP: 1.02 (ref 0.87–1.13)
MCH RBC QN AUTO: 30.8 PG (ref 27–33)
MCHC RBC AUTO-ENTMCNC: 33.5 G/DL (ref 33.6–35)
MCV RBC AUTO: 91.9 FL (ref 81.4–97.8)
PLATELET # BLD AUTO: 241 K/UL (ref 164–446)
PMV BLD AUTO: 9.7 FL (ref 9–12.9)
PROTHROMBIN TIME: 13.1 SEC (ref 12–14.6)
RBC # BLD AUTO: 4.45 M/UL (ref 4.2–5.4)
SARS-COV-2 RNA RESP QL NAA+PROBE: NOTDETECTED
SPECIMEN SOURCE: NORMAL
WBC # BLD AUTO: 4.6 K/UL (ref 4.8–10.8)

## 2021-11-23 PROCEDURE — 36415 COLL VENOUS BLD VENIPUNCTURE: CPT

## 2021-11-23 PROCEDURE — 85610 PROTHROMBIN TIME: CPT

## 2021-11-23 PROCEDURE — U0005 INFEC AGEN DETEC AMPLI PROBE: HCPCS

## 2021-11-23 PROCEDURE — 85027 COMPLETE CBC AUTOMATED: CPT

## 2021-11-23 PROCEDURE — U0003 INFECTIOUS AGENT DETECTION BY NUCLEIC ACID (DNA OR RNA); SEVERE ACUTE RESPIRATORY SYNDROME CORONAVIRUS 2 (SARS-COV-2) (CORONAVIRUS DISEASE [COVID-19]), AMPLIFIED PROBE TECHNIQUE, MAKING USE OF HIGH THROUGHPUT TECHNOLOGIES AS DESCRIBED BY CMS-2020-01-R: HCPCS

## 2021-11-23 PROCEDURE — C9803 HOPD COVID-19 SPEC COLLECT: HCPCS

## 2021-11-29 NOTE — OR NURSING
COVID-19 Pre-surgery screenin. Do you have an undiagnosed respiratory illness or symptoms such as coughing or sneezing? no (Yes/No)  a. Onset of Sx  n/a  b. Acute vs. chronic respiratory illness n/a    2. Do you have an unexplained fever greater than 100.4 degrees Fahrenheit or 38 degrees Celsius?     no (Yes/No)    3. Have you had direct exposure to a patient who tested positive for Covid-19?    no (Yes/No)    4. Have you had any loss of your sense of taste or smell? Have you had N/V or sore throat? no    Patient has been informed of visitor policy and asked to wear a mask upon entering the hospital   yes (Yes/No)

## 2021-11-30 ENCOUNTER — HOSPITAL ENCOUNTER (OUTPATIENT)
Facility: MEDICAL CENTER | Age: 66
End: 2021-11-30
Attending: INTERNAL MEDICINE | Admitting: RADIOLOGY
Payer: COMMERCIAL

## 2021-11-30 ENCOUNTER — APPOINTMENT (OUTPATIENT)
Dept: RADIOLOGY | Facility: MEDICAL CENTER | Age: 66
End: 2021-11-30
Attending: STUDENT IN AN ORGANIZED HEALTH CARE EDUCATION/TRAINING PROGRAM
Payer: COMMERCIAL

## 2021-11-30 ENCOUNTER — APPOINTMENT (OUTPATIENT)
Dept: RADIOLOGY | Facility: MEDICAL CENTER | Age: 66
End: 2021-11-30
Attending: NURSE PRACTITIONER
Payer: COMMERCIAL

## 2021-11-30 VITALS
BODY MASS INDEX: 27.55 KG/M2 | WEIGHT: 145.94 LBS | HEIGHT: 61 IN | DIASTOLIC BLOOD PRESSURE: 66 MMHG | SYSTOLIC BLOOD PRESSURE: 119 MMHG | OXYGEN SATURATION: 99 % | HEART RATE: 78 BPM | RESPIRATION RATE: 14 BRPM | TEMPERATURE: 97 F

## 2021-11-30 DIAGNOSIS — R91.1 LUNG NODULE: ICD-10-CM

## 2021-11-30 LAB — PATHOLOGY CONSULT NOTE: NORMAL

## 2021-11-30 PROCEDURE — 700111 HCHG RX REV CODE 636 W/ 250 OVERRIDE (IP): Performed by: STUDENT IN AN ORGANIZED HEALTH CARE EDUCATION/TRAINING PROGRAM

## 2021-11-30 PROCEDURE — 71045 X-RAY EXAM CHEST 1 VIEW: CPT

## 2021-11-30 PROCEDURE — 700105 HCHG RX REV CODE 258: Performed by: RADIOLOGY

## 2021-11-30 PROCEDURE — 160002 HCHG RECOVERY MINUTES (STAT)

## 2021-11-30 PROCEDURE — 99153 MOD SED SAME PHYS/QHP EA: CPT

## 2021-11-30 PROCEDURE — 88312 SPECIAL STAINS GROUP 1: CPT | Mod: 59

## 2021-11-30 PROCEDURE — 88305 TISSUE EXAM BY PATHOLOGIST: CPT

## 2021-11-30 PROCEDURE — 700111 HCHG RX REV CODE 636 W/ 250 OVERRIDE (IP)

## 2021-11-30 RX ORDER — SODIUM CHLORIDE 9 MG/ML
1000 INJECTION, SOLUTION INTRAVENOUS
Status: COMPLETED | OUTPATIENT
Start: 2021-11-30 | End: 2021-11-30

## 2021-11-30 RX ORDER — MIDAZOLAM HYDROCHLORIDE 1 MG/ML
INJECTION INTRAMUSCULAR; INTRAVENOUS
Status: COMPLETED
Start: 2021-11-30 | End: 2021-11-30

## 2021-11-30 RX ORDER — MIDAZOLAM HYDROCHLORIDE 1 MG/ML
.5-2 INJECTION INTRAMUSCULAR; INTRAVENOUS PRN
Status: ACTIVE | OUTPATIENT
Start: 2021-11-30 | End: 2021-11-30

## 2021-11-30 RX ORDER — SODIUM CHLORIDE 9 MG/ML
500 INJECTION, SOLUTION INTRAVENOUS
Status: ACTIVE | OUTPATIENT
Start: 2021-11-30 | End: 2021-11-30

## 2021-11-30 RX ORDER — ONDANSETRON 2 MG/ML
4 INJECTION INTRAMUSCULAR; INTRAVENOUS PRN
Status: ACTIVE | OUTPATIENT
Start: 2021-11-30 | End: 2021-11-30

## 2021-11-30 RX ORDER — ONDANSETRON 2 MG/ML
INJECTION INTRAMUSCULAR; INTRAVENOUS
Status: DISCONTINUED
Start: 2021-11-30 | End: 2021-11-30 | Stop reason: HOSPADM

## 2021-11-30 RX ORDER — FLUMAZENIL 0.1 MG/ML
INJECTION INTRAVENOUS
Status: DISCONTINUED
Start: 2021-11-30 | End: 2021-11-30 | Stop reason: HOSPADM

## 2021-11-30 RX ORDER — NALOXONE HYDROCHLORIDE 0.4 MG/ML
INJECTION, SOLUTION INTRAMUSCULAR; INTRAVENOUS; SUBCUTANEOUS
Status: DISCONTINUED
Start: 2021-11-30 | End: 2021-11-30 | Stop reason: HOSPADM

## 2021-11-30 RX ADMIN — MIDAZOLAM HYDROCHLORIDE 1 MG: 1 INJECTION, SOLUTION INTRAMUSCULAR; INTRAVENOUS at 11:09

## 2021-11-30 RX ADMIN — FENTANYL CITRATE 50 MCG: 50 INJECTION, SOLUTION INTRAMUSCULAR; INTRAVENOUS at 11:09

## 2021-11-30 RX ADMIN — MIDAZOLAM HYDROCHLORIDE 1 MG: 1 INJECTION, SOLUTION INTRAMUSCULAR; INTRAVENOUS at 11:13

## 2021-11-30 RX ADMIN — SODIUM CHLORIDE 1000 ML: 9 INJECTION, SOLUTION INTRAVENOUS at 10:30

## 2021-11-30 RX ADMIN — FENTANYL CITRATE 50 MCG: 50 INJECTION, SOLUTION INTRAMUSCULAR; INTRAVENOUS at 11:13

## 2021-11-30 ASSESSMENT — PAIN DESCRIPTION - PAIN TYPE
TYPE: SURGICAL PAIN
TYPE: SURGICAL PAIN
TYPE: ACUTE PAIN
TYPE: SURGICAL PAIN
TYPE: SURGICAL PAIN

## 2021-11-30 ASSESSMENT — FIBROSIS 4 INDEX: FIB4 SCORE: 1.69

## 2021-11-30 NOTE — PROGRESS NOTES
Pt is aaox4, resting comfortably in [facility] on room air. She informs she does not have any pain or n/v. Cath site is clean, dry and intact with no swelling or bleeding noted. Respirations are equal and unlabored with clear lung sounds upon auscultation. She does not complain of sob, chest pain, dizziness, lightheadedness during assessments. Will continue to monitor.    First CXR completed.

## 2021-11-30 NOTE — DISCHARGE INSTRUCTIONS
ACTIVITY: Rest and take it easy for the first 24 hours.  A responsible adult is recommended to remain with you during that time.  It is normal to feel sleepy.  We encourage you to not do anything that requires balance, judgment or coordination.    MILD FLU-LIKE SYMPTOMS ARE NORMAL. YOU MAY EXPERIENCE GENERALIZED MUSCLE ACHES, THROAT IRRITATION, HEADACHE AND/OR SOME NAUSEA.    FOR 24 HOURS DO NOT:  Drive, operate machinery or run household appliances.  Drink beer or alcoholic beverages.   Make important decisions or sign legal documents.    SPECIAL INSTRUCTIONS:   Lung Biopsy, Care After  This sheet gives you information about how to care for yourself after your procedure. Your health care provider may also give you more specific instructions depending on the type of biopsy you had. If you have problems or questions, contact your health care provider.  What can I expect after the procedure?  After the procedure, it is common to have:  · A cough.  · A sore throat.  · Pain where a needle, bronchoscope, or incision was used to collect a biopsy sample (biopsy site).  Follow these instructions at home:  Medicines  · Take over-the-counter and prescription medicines only as told by your health care provider.  · Do not drink alcohol if your health care provider tells you not to drink.  · Ask your health care provider if the medicine prescribed to you:  ? Requires you to avoid driving or using heavy machinery.  ? Can cause constipation. You may need to take these actions to prevent or treat constipation:  § Drink enough fluid to keep your urine pale yellow.  § Take over-the-counter or prescription medicines.  § Eat foods that are high in fiber, such as beans, whole grains, and fresh fruits and vegetables.  § Limit foods that are high in fat and processed sugars, such as fried or sweet foods.  · Do not drive for 24 hours if you were given a sedative.  Biopsy site care    · Follow instructions from your health care provider  about how to take care of your biopsy site. Make sure you:  ? Wash your hands with soap and water before and after you change your bandage (dressing). If soap and water are not available, use hand .  ? Change your dressing as told by your health care provider.  ? Leave stitches (sutures), skin glue, or adhesive strips in place. These skin closures may need to stay in place for 2 weeks or longer. If adhesive strip edges start to loosen and curl up, you may trim the loose edges. Do not remove adhesive strips completely unless your health care provider tells you to do that.  · Do not take baths, swim, or use a hot tub until your health care provider approves. Ask your health care provider if you may take showers. You may only be allowed to take sponge baths.  · Check your biopsy site every day for signs of infection. Check for:  ? Redness, swelling, or more pain.  ? Fluid or blood.  ? Warmth.  ? Pus or a bad smell.  General instructions  · Return to your normal activities as told by your health care provider. Ask your health care provider what activities are safe for you.  · It is up to you to get the results of your procedure. Ask your health care provider, or the department that is doing the procedure, when your results will be ready.  · Keep all follow-up visits as told by your health care provider. This is important.  Contact a health care provider if:  · You have a fever.  · You have redness, swelling, or more pain around your biopsy site.  · You have fluid or blood coming from your biopsy site.  · Your biopsy site feels warm to the touch.  · You have pus or a bad smell coming from your biopsy site.  · You have pain that does not get better with medicine.  Get help right away if:  · You cough up blood.  · You have trouble breathing.  · You have chest pain.  · You lose consciousness.  Summary  · After the procedure, it is common to have a sore throat and a cough.  · Return to your normal activities as told  by your health care provider. Ask your health care provider what activities are safe for you.  · Take over-the-counter and prescription medicines only as told by your health care provider.  · Report any unusual symptoms to your health care provider.  This information is not intended to replace advice given to you by your health care provider. Make sure you discuss any questions you have with your health care provider.    DIET: To avoid nausea, slowly advance diet as tolerated, avoiding spicy or greasy foods for the first day.  Add more substantial food to your diet according to your physician's instructions.  INCREASE FLUIDS AND FIBER TO AVOID CONSTIPATION.    SURGICAL DRESSING/BATHING:     Keep dressing clean, dry and intact for 24 hours, then you may remove, ok to shower after 24 hours, do not submerge in bath, hot tub or swimming pool for 1 week    FOLLOW-UP APPOINTMENT:  A follow-up appointment should be arranged with your doctor in 1-2 weeks; call to schedule.    You should CALL YOUR PHYSICIAN if you develop:  Fever greater than 101 degrees F.  Pain not relieved by medication, or persistent nausea or vomiting.  Excessive bleeding (blood soaking through dressing) or unexpected drainage from the wound.  Extreme redness or swelling around the incision site, drainage of pus or foul smelling drainage.  Inability to urinate or empty your bladder within 8 hours.  Problems with breathing or chest pain.    You should call 911 if you develop problems with breathing or chest pain.  If you are unable to contact your doctor or surgical center, you should go to the nearest emergency room or urgent care center.    Physician's telephone #: [phone] Dr Del Castillo    If any questions arise, call your doctor.  If your doctor is not available, please feel free to call the Surgical Center at [phone]. The Contact Center is open Monday through Friday 7AM to 5PM and may speak to a nurse at [phone], or toll free at  [phone].     A registered nurse may call you a few days after your surgery to see how you are doing after your procedure.    MEDICATIONS: Resume taking daily medication.  Take prescribed pain medication with food.  If no medication is prescribed, you may take non-aspirin pain medication if needed.  PAIN MEDICATION CAN BE VERY CONSTIPATING.  Take a stool softener or laxative such as senokot, pericolace, or milk of magnesia if needed.    Prescription given for none.  Last pain medication given at none.    If your physician has prescribed pain medication that includes Acetaminophen (Tylenol), do not take additional Acetaminophen (Tylenol) while taking the prescribed medication.    Depression / Suicide Risk    As you are discharged from this [facility] facility, it is important to learn how to keep safe from harming yourself.    Recognize the warning signs:  · Abrupt changes in personality, positive or negative- including increase in energy   · Giving away possessions  · Change in eating patterns- significant weight changes-  positive or negative  · Change in sleeping patterns- unable to sleep or sleeping all the time   · Unwillingness or inability to communicate  · Depression  · Unusual sadness, discouragement and loneliness  · Talk of wanting to die  · Neglect of personal appearance   · Rebelliousness- reckless behavior  · Withdrawal from people/activities they love  · Confusion- inability to concentrate     If you or a loved one observes any of these behaviors or has concerns about self-harm, here's what you can do:  · Talk about it- your feelings and reasons for harming yourself  · Remove any means that you might use to hurt yourself (examples: pills, rope, extension cords, firearm)  · Get professional help from the community (Mental Health, Substance Abuse, psychological counseling)  · Do not be alone:Call your Safe Contact- someone whom you trust who will be there for you.  · Call your local CRISIS  HOTLINE 104-1080 or 404-573-0532  · Call your local Children's Mobile Crisis Response Team [place] (932) 529-7367 or www.[employer]  · Call the toll free National Suicide Prevention Hotlines   · National Suicide Prevention Lifeline 821-713-YDOD (4471)  · National [employer] Line Network 800-SUICIDE (760-7480)

## 2021-11-30 NOTE — OR NURSING
Pt arrived in Phase 2 at 1342, ambulated to recliner chair, denies pain, nausea or SOB, right lateral back area gauze/transaprent film sheath site, soft, clean, dry and intact, personal belongings at bedside, eating box lunch, will wait for follow up chest x ray at 1430 prior to discharge to home.

## 2021-11-30 NOTE — OR SURGEON
Immediate Post- Operative Note        Findings: RLL mass      Procedure(s): CT guided biopsy      Estimated Blood Loss: Less than 5 ml        Complications: None            11/30/2021     11:29 AM     Jr Pappas M.D.

## 2021-11-30 NOTE — PROGRESS NOTES
Pt presents to CT suite via IR Staff. Pt was consented by MD at bedside, confirmed by this RN and consent at bedside. Pt transferred to CT table in prone position. Pt placed on monitor, prepped and draped in a sterile fashion. Resting comfortably at this time.     [employer]al[employer] System   REF# [id number]  LOT# [id number]  Exp: 09/30/2024

## 2021-11-30 NOTE — OR NURSING
Pt chest x ray completed, results unchanged from earlier x ray, VSS, discharge instructions reviewed with pt and , they verbalized understanding of instructions, PIV removed, tip intact, discharged via wheelchair to home with  at 1508.

## 2021-12-02 ENCOUNTER — OFFICE VISIT (OUTPATIENT)
Dept: HEMATOLOGY ONCOLOGY | Facility: MEDICAL CENTER | Age: 66
End: 2021-12-02
Payer: COMMERCIAL

## 2021-12-02 VITALS
OXYGEN SATURATION: 97 % | HEART RATE: 73 BPM | TEMPERATURE: 98.3 F | DIASTOLIC BLOOD PRESSURE: 86 MMHG | SYSTOLIC BLOOD PRESSURE: 140 MMHG | WEIGHT: 145.5 LBS | BODY MASS INDEX: 28.57 KG/M2 | HEIGHT: 60 IN | RESPIRATION RATE: 17 BRPM

## 2021-12-02 DIAGNOSIS — R91.1 LUNG NODULE: ICD-10-CM

## 2021-12-02 PROCEDURE — 99213 OFFICE O/P EST LOW 20 MIN: CPT | Performed by: NURSE PRACTITIONER

## 2021-12-02 ASSESSMENT — FIBROSIS 4 INDEX: FIB4 SCORE: 1.69

## 2021-12-02 ASSESSMENT — PAIN SCALES - GENERAL: PAINLEVEL: NO PAIN

## 2021-12-02 ASSESSMENT — ENCOUNTER SYMPTOMS
WEIGHT LOSS: 0
SHORTNESS OF BREATH: 0
SPUTUM PRODUCTION: 0
WHEEZING: 0
FEVER: 0
COUGH: 0
CHILLS: 0
HEMOPTYSIS: 0

## 2021-12-03 NOTE — PROGRESS NOTES
Subjective     Cindi Cooper is a 66 y.o. female who presents with Other (IC EST/ Biopsy Results )            HPI    Patient seen today in follow-up for biopsy results.  She presents accompanied by her  for today's visit.    Patient was seen after an ER visit on 10/20/2021.  During her ER visit she was found to have pneumonia and started on antibiotics.  She had follow-up imaging including chest x-rays and a CT chest in the outpatient setting which was completed on 10/27/2021.  CT showed a mass in the base of the right hemothorax measuring 3.7 x 4.2 cm.  Reading radiologist stated that this could represent a pulmonary mass versus a pleural mass arising from the diaphragmatic pleura.  Peripheral calcifications were present.  Differentials do include pleural tumor such as mesothelioma, lung neoplasm, atypical carcinoid, hamartoma, congenital mass.  CT-guided biopsy was completed which patient presents today to review.    Clinically she is doing very well.  She stated that she tolerated the biopsy very well with no significant side effects.  Respiratory speaking she denies any changes in coughing, wheezing or shortness of breath.  She does still remain active and stated she walks 3 miles today without any concerns.    Right lower lobe lung biopsy mass shows minute fragment of fibrous/scar tissue with reactive changes, areas of necrosis with associated calcifications and focally lined by mesothelium representing pleural surface.  There is no granulomas identified.  GMS fungal and AFB stains were negative for any fungal organisms and acid-fast organisms.  There was some small fragments of benign liver tissue identified.  No malignancy identified.  I personally reviewed the pathology report in detail with the patient and her  today.    Allergies   Allergen Reactions   • Codeine Nausea     Current Outpatient Medications on File Prior to Visit   Medication Sig Dispense Refill   • simvastatin (ZOCOR)  20 MG Tab TAKE 1 TABLET BY MOUTH EVERY DAY. IN THE EVENING 90 Tablet 0   • Coenzyme Q10 (COQ10 PO) Take 1 Tablet by mouth every day.     • Multiple Vitamin (MULTIVITAMIN ADULT PO) Take  by mouth.     • VITAMIN E PO Take 1 Tablet by mouth every day.     • Cholecalciferol (VITAMIN D PO) Take 1 Tab by mouth every day.     • Ascorbic Acid (VITAMIN C PO) Take 1 Tab by mouth every day.     • Cyanocobalamin (VITAMIN B 12 PO) Take 1 Tab by mouth every day.     • CALCIUM PO Take 1 Tab by mouth every day.     • Glucosamine-Chondroitin (GLUCOSAMINE CHONDR COMPLEX PO) Take 1 Tab by mouth every day.       No current facility-administered medications on file prior to visit.         Review of Systems   Constitutional: Negative for chills, fever, malaise/fatigue and weight loss.   Respiratory: Negative for cough, hemoptysis, sputum production, shortness of breath and wheezing.               Objective     /86   Pulse 73   Temp 36.8 °C (98.3 °F) (Temporal)   Resp 17   Ht 1.524 m (5')   Wt 66 kg (145 lb 8.1 oz)   SpO2 97%   BMI 28.42 kg/m²      Physical Exam  Vitals reviewed.   Constitutional:       Appearance: Normal appearance.   Cardiovascular:      Rate and Rhythm: Normal rate and regular rhythm.   Pulmonary:      Effort: Pulmonary effort is normal. No respiratory distress.      Breath sounds: Normal breath sounds. No wheezing.   Neurological:      Mental Status: She is alert and oriented to person, place, and time.   Psychiatric:         Mood and Affect: Mood normal.         Behavior: Behavior normal.               FINAL DIAGNOSIS:     A. Right lower lung mass biopsy:          Minute fragment of fibrous/scar tissue with reactive changes,           areas of necrosis with associated calcifications, and focally           lined by mesothelium representing pleural surface.          No granulomas identified.          GMS fungal and AFB stains are negative for fungal organisms and           acid-fast organisms respectively  (with appropriate controls).          Small fragments of benign liver tissue also identified.          No malignancy identified in a limited specimen.          Please see comment.     Comment: Patient's clinical history significant for abnormal imaging   studies demonstrating one mass and the base of the right lung is noted.   The above biopsy demonstrates minute fragment of fibrous/scar tissue   with areas of necrosis and reactive changes. Clinical and radiologic   correlation is recommended to determine if the pathologic findings are   concordant with the area of concern noted on imaging studies, as the   biopsy may not be representative of the entire lesion. The case was   also reviewed by another pathologist, Dr. Bangura, who agreed with the   above interpretation.          Assessment & Plan        1. Lung nodule  [id number] BASE TO MID-THIGH       Patient with a lung mass in the right lower lobe.  Biopsy is negative for malignancy.  Pathologist does recommend clinical and radiographic correlation to determine that the pathology findings are concordant.  The biopsy may not be representative the entire lesion.  Patient is completely asymptomatic.  She is a never smoker making her low risk for lung cancer.  However despite that she does have a 4.2 cm mass in the right lower lobe.  Therefore I will go ahead and proceed with a PET/CT for further evaluation.  Should the PET/CT show benign findings then would believe that this is not malignant.  However, should PET/CT demonstrate concerning findings then I did discuss with patient the possibility of rebiopsy.  Will await PET/CT results to discuss further plan of care.  Patient did verbalize understanding's and agreement the plan.  She will follow-up with me in the clinic to review the results of PET/CT once completed.      Please note that this dictation was created using voice recognition software. I have made every reasonable attempt to correct obvious errors, but  I expect that there are errors of grammar and possibly content that I did not discover before finalizing the note.

## 2021-12-06 ENCOUNTER — HOSPITAL ENCOUNTER (OUTPATIENT)
Dept: RADIOLOGY | Facility: MEDICAL CENTER | Age: 66
End: 2021-12-06
Attending: NURSE PRACTITIONER
Payer: COMMERCIAL

## 2021-12-06 DIAGNOSIS — R91.1 LUNG NODULE: ICD-10-CM

## 2021-12-06 PROCEDURE — A9552 F18 FDG: HCPCS

## 2021-12-07 ENCOUNTER — OFFICE VISIT (OUTPATIENT)
Dept: HEMATOLOGY ONCOLOGY | Facility: MEDICAL CENTER | Age: 66
End: 2021-12-07
Payer: COMMERCIAL

## 2021-12-07 VITALS
HEIGHT: 61 IN | OXYGEN SATURATION: 96 % | HEART RATE: 65 BPM | SYSTOLIC BLOOD PRESSURE: 150 MMHG | RESPIRATION RATE: 17 BRPM | TEMPERATURE: 98.8 F | DIASTOLIC BLOOD PRESSURE: 90 MMHG | BODY MASS INDEX: 27.58 KG/M2 | WEIGHT: 146.06 LBS

## 2021-12-07 DIAGNOSIS — R91.1 LUNG NODULE: ICD-10-CM

## 2021-12-07 PROCEDURE — 99213 OFFICE O/P EST LOW 20 MIN: CPT | Performed by: NURSE PRACTITIONER

## 2021-12-07 ASSESSMENT — PAIN SCALES - GENERAL: PAINLEVEL: NO PAIN

## 2021-12-07 ASSESSMENT — FIBROSIS 4 INDEX: FIB4 SCORE: 1.69

## 2021-12-09 ASSESSMENT — ENCOUNTER SYMPTOMS
SHORTNESS OF BREATH: 0
COUGH: 0

## 2021-12-09 NOTE — PROGRESS NOTES
Subjective     Cindi Cooper is a 66 y.o. female who presents with Other (IC EST/PET Results)            HPI    Patient seen today in follow-up for PET/CT results.  She presents accompanied by her  for today's visit.    Patient was seen after an ER visit on 10/20/2021.  During her ER visit she was found to have pneumonia and started on antibiotics.  She had follow-up imaging including chest x-rays and a CT chest in the outpatient setting which was completed on 10/27/2021.  CT showed a mass in the base of the right hemothorax measuring 3.7 x 4.2 cm.  Reading radiologist stated that this could represent a pulmonary mass versus a pleural mass arising from the diaphragmatic pleura.  Peripheral calcifications were present.  Differentials do include pleural tumor such as mesothelioma, lung neoplasm, atypical carcinoid, hamartoma, congenital mass.  CT-guided biopsy was completed which shows minute fragment of fibrous/scar tissue with reactive changes, areas of necrosis with associated calcifications and focally lined by mesothelium representing pleural surface.  There is no granulomas identified.  GMS fungal and AFB stains were negative for any fungal organisms and acid-fast organisms.  There was some small fragments of benign liver tissue identified.  No malignancy identified.    Patient pleased with results of no malignancy identified.  However due to extensive size of this mass I patient completed a PET/CT for further evaluation which she is here to review.  PET/CT shows no significant uptake within the right lower lobe lung mass.  According to the reading radiologist this appears to be a postinflammatory mass.  There is no abnormal uptake within the parenchyma scarring of the right upper lobe.  Incidental findings do note mildly enlarged heart, hepatic cysts which are again noted, aortic atherosclerosis and multiple calcified uterine fibroids.  I personally reviewed the imaging report images in detail,  "and I reviewed both of them with the patient today.    Allergies   Allergen Reactions   • Codeine Nausea     Current Outpatient Medications on File Prior to Visit   Medication Sig Dispense Refill   • vitamin D3 (CHOLECALCIFEROL) 400 UNIT Tab Take 1,000 Units by mouth every day.     • simvastatin (ZOCOR) 20 MG Tab TAKE 1 TABLET BY MOUTH EVERY DAY. IN THE EVENING 90 Tablet 0   • Coenzyme Q10 (COQ10 PO) Take 1 Tablet by mouth every day.     • Multiple Vitamin (MULTIVITAMIN ADULT PO) Take  by mouth.     • VITAMIN E PO Take 1 Tablet by mouth every day.     • Cholecalciferol (VITAMIN D PO) Take 1 Tab by mouth every day.     • Ascorbic Acid (VITAMIN C PO) Take 1 Tab by mouth every day.     • Cyanocobalamin (VITAMIN B 12 PO) Take 1 Tab by mouth every day.     • CALCIUM PO Take 1 Tab by mouth every day.     • Glucosamine-Chondroitin (GLUCOSAMINE CHONDR COMPLEX PO) Take 1 Tab by mouth every day.       No current facility-administered medications on file prior to visit.           Review of Systems   Constitutional: Negative for malaise/fatigue.   Respiratory: Negative for cough and shortness of breath.               Objective     /90   Pulse 65   Temp 37.1 °C (98.8 °F) (Temporal)   Resp 17   Ht 1.549 m (5' 1\")   Wt 66.3 kg (146 lb 0.9 oz)   SpO2 96%   BMI 27.60 kg/m²      Physical Exam  Vitals reviewed.   Constitutional:       Appearance: Normal appearance.   Cardiovascular:      Rate and Rhythm: Normal rate and regular rhythm.      Heart sounds: Normal heart sounds.   Pulmonary:      Effort: Pulmonary effort is normal.      Breath sounds: Normal breath sounds.   Neurological:      Mental Status: She is alert and oriented to person, place, and time.   Psychiatric:         Mood and Affect: Mood normal.         Behavior: Behavior normal.              [id number] BASE TO MID-THIGH    Result Date: 12/6/2021 12/6/2021 11:15 AM HISTORY/REASON FOR EXAM:  RUL mass on CT status post biopsy with results of scar tissue " and reactive changes but no malignancy identified. Patient is asymptomatic and has never been a smoker. TECHNIQUE/EXAM DESCRIPTION AND NUMBER OF VIEWS: PET body imaging. Initially, 16.58 mCi F-18 FDG was administered intravenously under standardized conditions.  Approximately 45 minutes after FDG administration, the patient was placed in the supine position on the PET CT table. Blood glucose level was 88mg/dL. Low dose spiral CT imaging was performed from the skull base to the mid thighs. PET imaging was then performed from the skull base to the mid thighs. CT images, PET images, and PET/CT fused images were reviewed on a PACS 3D workstation. The limited non-contrast CT data are used primarily for attenuation correction and anatomic correlation.  Evaluation of solid organs and bowel are especially limited utilizing this technique. COMPARISON: CT chest 10/27/2021 FINDINGS: Head and Neck:  There is no abnormal FDG uptake. Thorax:   There is potential minimal uptake along the inferior aspect of the right lower lobe lung mass although this is felt to most likely be due to volume averaging with the liver. Mass is unchanged in appearance with internal calcifications again noted. There is no abnormal uptake within the ill-defined parenchymal opacity in the right upper lobe consistent with scarring. Background blood pool is 2.42. Abdomen/Pelvis:   There is no abnormal FDG uptake. There is physiologic uptake scattered throughout the colon. Background liver uptake is 2.99. Musculoskeletal:  There is no abnormal FDG uptake. Additional CT findings:  There are bilateral breast implants. Heart is mildly enlarged. Hepatic cysts are again noted. There is aortic atherosclerosis. There are multiple calcified uterine fibroids.     1.  There is no significant uptake within the right lower lobe mass which appears to be associated with the right major fissure and contains calcifications. This is most likely a postinflammatory mass. 2.   There is no abnormal uptake within parenchyma scarring in the right upper lobe.         Assessment & Plan        1. Lung nodule  CT-CHEST (THORAX) W/O       Patient with a lung nodule noted in the right lower lobe.  This has been biopsied and found to be negative for malignancy.  PET/CT also does not show any significant uptake consistent with a postinflammatory process.  I reached out to thoracic surgeon as well and surgery not recommended.  Based on these findings I will go ahead and recommend patient have a repeat CT chest without contrast in 6 months to evaluate this lung nodule.  However I will results have been very favorable.  Patient will complete CT chest in 6 months and I will contact her via phone to review the results.  Patient did verbalize understanding's and agreement the plan.    With regards to the incidental findings on PET/CT including mildly enlarged heart as well as aortic atherosclerosis I will have patient follow-up with her PCP for these findings.  She did state that she was aware of the uterine fibroids.      Please note that this dictation was created using voice recognition software. I have made every reasonable attempt to correct obvious errors, but I expect that there are errors of grammar and possibly content that I did not discover before finalizing the note.

## 2022-02-09 ENCOUNTER — HOSPITAL ENCOUNTER (OUTPATIENT)
Dept: RADIOLOGY | Facility: MEDICAL CENTER | Age: 67
End: 2022-02-09
Attending: FAMILY MEDICINE
Payer: COMMERCIAL

## 2022-02-09 DIAGNOSIS — Z12.31 VISIT FOR SCREENING MAMMOGRAM: ICD-10-CM

## 2022-02-09 PROCEDURE — 77063 BREAST TOMOSYNTHESIS BI: CPT

## 2022-03-08 ENCOUNTER — TELEPHONE (OUTPATIENT)
Dept: HEALTH INFORMATION MANAGEMENT | Facility: OTHER | Age: 67
End: 2022-03-08
Payer: MEDICARE

## 2022-06-06 ENCOUNTER — HOSPITAL ENCOUNTER (OUTPATIENT)
Dept: RADIOLOGY | Facility: MEDICAL CENTER | Age: 67
End: 2022-06-06
Attending: NURSE PRACTITIONER
Payer: MEDICARE

## 2022-06-06 DIAGNOSIS — R91.1 LUNG NODULE: ICD-10-CM

## 2022-06-06 PROCEDURE — 71250 CT THORAX DX C-: CPT | Mod: MC

## 2022-06-08 ENCOUNTER — TELEPHONE (OUTPATIENT)
Dept: HEMATOLOGY ONCOLOGY | Facility: MEDICAL CENTER | Age: 67
End: 2022-06-08
Payer: MEDICARE

## 2022-06-08 DIAGNOSIS — R91.1 LUNG NODULE: ICD-10-CM

## 2022-06-08 NOTE — TELEPHONE ENCOUNTER
Patient previously seen for a lung nodule, originally seen back in November 2021.  Patient had gone through a biopsy of the right lower lobe lung nodule which was found to be negative as well as a PET scan which did not show any significant uptake consistent a postinflammatory process.  However I did recommend patient have a repeat CT chest in 6 months which she recently completed on 6/6/2022.    CT shows that the right lower lobe mass has decreased in size, previously measuring 3.7 x 4.2 cm and now measuring 2.4 x 3.7 cm.  This is consistent with a benign process as what was found by the biopsy and PET/CT.  Patient was very pleased with the results.  I did inform patient that she does still have nodules in her lung and I would recommend she have continued monitoring and I would place a referral to our pulmonary group for continued management and monitoring of this nodule.  Patient did verbalize understanding's and agreed with the plan.  Referral has been placed to pulmonary.    No further follow-up needed with IOC.        CT-CHEST (THORAX) W/O    Result Date: 6/6/2022 6/6/2022 9:29 AM HISTORY/REASON FOR EXAM:  6 month Follow up RLL mass; 6 month Follow up RLL mass. TECHNIQUE/EXAM DESCRIPTION: CT scan of the chest without contrast. Noncontrast helical scanning of the chest was obtained from the lung apices through the adrenal glands. Low dose optimization technique was utilized for this CT exam including automated exposure control and adjustment of the mA and/or kV according to patient size. COMPARISON: 10/27/2021. 12/6/2021 FINDINGS: Lungs: The mass in the right lower lobe measuring 2.4 x 3.7 cm, previously 3.7 x 4.2 cm. There is some calcification within the mass. Unchanged cluster of tiny nodules in the right upper lobe with associated cystic change. Airway: Patent Pleura: No pleural effusion or pneumothorax.. Mediastinum/[facility]: No significant adenopathy. Heart and pericardium:   Mild  coronary artery  calcification. No pericardial effusion. Thoracic aorta and great vessels:  No aneurysm. Lymph nodes: No enlarged mediastinal or hilar lymph nodes. Thoracic spine:  No fracture or malalignment. No compression deformity. Chest wall: Bilateral breast implants. Visualized upper abdomen:  Unremarkable.     1. The mass in the right lower lobe is smaller than prior. 2. Unchanged cluster of tiny nodules in the right upper lobe with associated cystic change, likely postinflammatory changes. 3. No new or suspicious pulmonary nodule.      1. Lung nodule  Referral to Pulmonary and Sleep Medicine

## 2022-08-12 ENCOUNTER — HOSPITAL ENCOUNTER (OUTPATIENT)
Dept: LAB | Facility: MEDICAL CENTER | Age: 67
End: 2022-08-12
Attending: SURGERY
Payer: MEDICARE

## 2022-08-12 LAB
ANION GAP SERPL CALC-SCNC: 10 MMOL/L (ref 7–16)
BASOPHILS # BLD AUTO: 0.4 % (ref 0–1.8)
BASOPHILS # BLD: 0.02 K/UL (ref 0–0.12)
BUN SERPL-MCNC: 12 MG/DL (ref 8–22)
CALCIUM SERPL-MCNC: 9.4 MG/DL (ref 8.5–10.5)
CHLORIDE SERPL-SCNC: 104 MMOL/L (ref 96–112)
CO2 SERPL-SCNC: 25 MMOL/L (ref 20–33)
CREAT SERPL-MCNC: 0.85 MG/DL (ref 0.5–1.4)
EOSINOPHIL # BLD AUTO: 0.05 K/UL (ref 0–0.51)
EOSINOPHIL NFR BLD: 0.9 % (ref 0–6.9)
ERYTHROCYTE [DISTWIDTH] IN BLOOD BY AUTOMATED COUNT: 47.4 FL (ref 35.9–50)
GFR SERPLBLD CREATININE-BSD FMLA CKD-EPI: 76 ML/MIN/1.73 M 2
GLUCOSE SERPL-MCNC: 110 MG/DL (ref 65–99)
HCT VFR BLD AUTO: 42 % (ref 37–47)
HGB BLD-MCNC: 13.9 G/DL (ref 12–16)
IMM GRANULOCYTES # BLD AUTO: 0.01 K/UL (ref 0–0.11)
IMM GRANULOCYTES NFR BLD AUTO: 0.2 % (ref 0–0.9)
LYMPHOCYTES # BLD AUTO: 1.24 K/UL (ref 1–4.8)
LYMPHOCYTES NFR BLD: 22.6 % (ref 22–41)
MCH RBC QN AUTO: 30.4 PG (ref 27–33)
MCHC RBC AUTO-ENTMCNC: 33.1 G/DL (ref 33.6–35)
MCV RBC AUTO: 91.9 FL (ref 81.4–97.8)
MONOCYTES # BLD AUTO: 0.39 K/UL (ref 0–0.85)
MONOCYTES NFR BLD AUTO: 7.1 % (ref 0–13.4)
NEUTROPHILS # BLD AUTO: 3.77 K/UL (ref 2–7.15)
NEUTROPHILS NFR BLD: 68.8 % (ref 44–72)
NRBC # BLD AUTO: 0 K/UL
NRBC BLD-RTO: 0 /100 WBC
PLATELET # BLD AUTO: 231 K/UL (ref 164–446)
PMV BLD AUTO: 10.1 FL (ref 9–12.9)
POTASSIUM SERPL-SCNC: 3.9 MMOL/L (ref 3.6–5.5)
RBC # BLD AUTO: 4.57 M/UL (ref 4.2–5.4)
SODIUM SERPL-SCNC: 139 MMOL/L (ref 135–145)
WBC # BLD AUTO: 5.5 K/UL (ref 4.8–10.8)

## 2022-08-12 PROCEDURE — 85025 COMPLETE CBC W/AUTO DIFF WBC: CPT

## 2022-08-12 PROCEDURE — 36415 COLL VENOUS BLD VENIPUNCTURE: CPT

## 2022-08-12 PROCEDURE — 80048 BASIC METABOLIC PNL TOTAL CA: CPT

## 2022-11-08 ENCOUNTER — DOCUMENTATION (OUTPATIENT)
Dept: HEALTH INFORMATION MANAGEMENT | Facility: OTHER | Age: 67
End: 2022-11-08
Payer: MEDICARE

## 2022-11-09 ENCOUNTER — APPOINTMENT (OUTPATIENT)
Dept: SLEEP MEDICINE | Facility: MEDICAL CENTER | Age: 67
End: 2022-11-09
Payer: MEDICARE

## 2023-01-12 ENCOUNTER — OFFICE VISIT (OUTPATIENT)
Dept: MEDICAL GROUP | Facility: IMAGING CENTER | Age: 68
End: 2023-01-12
Payer: MEDICARE

## 2023-01-12 VITALS
RESPIRATION RATE: 16 BRPM | BODY MASS INDEX: 27.83 KG/M2 | WEIGHT: 147.4 LBS | OXYGEN SATURATION: 99 % | HEIGHT: 61 IN | SYSTOLIC BLOOD PRESSURE: 142 MMHG | DIASTOLIC BLOOD PRESSURE: 90 MMHG | HEART RATE: 66 BPM | TEMPERATURE: 98.1 F

## 2023-01-12 DIAGNOSIS — Z00.00 MEDICARE ANNUAL WELLNESS VISIT, INITIAL: ICD-10-CM

## 2023-01-12 DIAGNOSIS — R91.8 MASS OF RIGHT LUNG: ICD-10-CM

## 2023-01-12 DIAGNOSIS — Z86.010 HISTORY OF COLON POLYPS: ICD-10-CM

## 2023-01-12 DIAGNOSIS — R10.11 RUQ PAIN: ICD-10-CM

## 2023-01-12 DIAGNOSIS — Z87.39 HISTORY OF OSTEOPENIA: ICD-10-CM

## 2023-01-12 DIAGNOSIS — D72.819 LEUKOPENIA, UNSPECIFIED TYPE: ICD-10-CM

## 2023-01-12 DIAGNOSIS — Z23 NEED FOR PNEUMOCOCCAL VACCINATION: ICD-10-CM

## 2023-01-12 DIAGNOSIS — E78.00 HYPERCHOLESTEREMIA: ICD-10-CM

## 2023-01-12 DIAGNOSIS — Z13.1 SCREENING FOR DIABETES MELLITUS: ICD-10-CM

## 2023-01-12 DIAGNOSIS — R03.0 ELEVATED BLOOD PRESSURE READING: ICD-10-CM

## 2023-01-12 DIAGNOSIS — E55.9 VITAMIN D INSUFFICIENCY: ICD-10-CM

## 2023-01-12 DIAGNOSIS — Z78.0 POSTMENOPAUSAL: ICD-10-CM

## 2023-01-12 PROCEDURE — G0009 ADMIN PNEUMOCOCCAL VACCINE: HCPCS | Performed by: FAMILY MEDICINE

## 2023-01-12 PROCEDURE — G0438 PPPS, INITIAL VISIT: HCPCS | Mod: 25 | Performed by: FAMILY MEDICINE

## 2023-01-12 PROCEDURE — 90677 PCV20 VACCINE IM: CPT | Performed by: FAMILY MEDICINE

## 2023-01-12 ASSESSMENT — ACTIVITIES OF DAILY LIVING (ADL): BATHING_REQUIRES_ASSISTANCE: 0

## 2023-01-12 ASSESSMENT — ENCOUNTER SYMPTOMS: GENERAL WELL-BEING: GOOD

## 2023-01-12 ASSESSMENT — FIBROSIS 4 INDEX: FIB4 SCORE: 1.79

## 2023-01-12 ASSESSMENT — PATIENT HEALTH QUESTIONNAIRE - PHQ9: CLINICAL INTERPRETATION OF PHQ2 SCORE: 0

## 2023-01-12 ASSESSMENT — PAIN SCALES - GENERAL: PAINLEVEL: NO PAIN

## 2023-01-12 NOTE — PROGRESS NOTES
Chief Complaint   Patient presents with    Annual Exam       HPI:  Cindi is a 67 y.o. here for Medicare Annual Wellness Visit    RUQ pain previously around September of last year, then improved  Periodically had symptoms.  Took breath away.     Right lung mass-seeing IOC.   Cancelled pulmonary appointment, will reschedule.     Hypercholesterolemia -she is on simvastatin 20 mg daily.  She has been watching grand daughter- Cesia. A lot going on.   Not exercising as much.  She is eating healthy.  Has BP cuff at home.     Vitamin D insufficiency-on supplements.  Low white blood cell count.    History of colon polyps.  History of osteopenia.  2019- dexa per gyn, possibly mild bone loss.   Cervical cancer screen-we will complete per gyn in future.       Patient Active Problem List    Diagnosis Date Noted    BMI 27.0-27.9,adult 03/09/2022    History of colon polyps 11/02/2018    Vitamin D insufficiency 03/30/2016    Hypercholesteremia 07/24/2015       Current Outpatient Medications   Medication Sig Dispense Refill    simvastatin (ZOCOR) 20 MG Tab TAKE 1 TABLET BY MOUTH EVERY DAY. IN THE EVENING 100 Tablet 0    vitamin D3 (CHOLECALCIFEROL) 400 UNIT Tab Take 1,000 Units by mouth every day.      Coenzyme Q10 (COQ10 PO) Take 1 Tablet by mouth every day.      Multiple Vitamin (MULTIVITAMIN ADULT PO) Take  by mouth.      VITAMIN E PO Take 1 Tablet by mouth every day.      Cholecalciferol (VITAMIN D PO) Take 1 Tab by mouth every day.      Ascorbic Acid (VITAMIN C PO) Take 1 Tab by mouth every day.      Cyanocobalamin (VITAMIN B 12 PO) Take 1 Tab by mouth every day.      CALCIUM PO Take 1 Tab by mouth every day.      Glucosamine-Chondroitin (GLUCOSAMINE CHONDR COMPLEX PO) Take 1 Tab by mouth every day.       No current facility-administered medications for this visit.        Patient is taking medications as noted in medication list.  Current supplements as per medication list.     Allergies: Codeine    Current social  contact/activities: Friends and family     Is patient current with immunizations? Yes.    She  reports that she has never smoked. She has never used smokeless tobacco. She reports current alcohol use of about 1.8 oz per week. She reports that she does not currently use drugs.  Counseling given: Not Answered  Tobacco comments: on and off as teen      ROS:    Gait: Uses no assistive device   Ostomy: No   Other tubes: No   Amputations: No   Chronic oxygen use No   Last eye exam: Been a while   Wears hearing aids: No   : Denies any urinary leakage during the last 6 months    Depression Screening  Little interest or pleasure in doing things?  0 - not at all  Feeling down, depressed, or hopeless? 0 - not at all  Patient Health Questionnaire Score: 0    If depressive symptoms identified deferred to follow up visit unless specifically addressed in assessment and plan.    Interpretation of PHQ-9 Total Score   Score Severity   1-4 No Depression   5-9 Mild Depression   10-14 Moderate Depression   15-19 Moderately Severe Depression   20-27 Severe Depression    Screening for Cognitive Impairment  Three Minute Recall (daughter, heaven, mountain)  2/3     Hal clock face with all 12 numbers and set the hands to show 10 past 11.  Yes 5  If cognitive concerns identified, deferred for follow up unless specifically addressed in assessment and plan.    Fall Risk Assessment  Has the patient had two or more falls in the last year or any fall with injury in the last year?  No  If fall risk identified, deferred for follow up unless specifically addressed in assessment and plan.    Safety Assessment  Throw rugs on floor.  Yes  Handrails on all stairs.  Yes  Good lighting in all hallways.  Yes  Difficulty hearing.  No  Patient counseled about all safety risks that were identified.    Functional Assessment ADLs  Are there any barriers preventing you from cooking for yourself or meeting nutritional needs?  No.    Are there any barriers  preventing you from driving safely or obtaining transportation?  No.    Are there any barriers preventing you from using a telephone or calling for help?  No.    Are there any barriers preventing you from shopping?  No.    Are there any barriers preventing you from taking care of your own finances?  No.    Are there any barriers preventing you from managing your medications?  No.    Are there any barriers preventing you from showering, bathing or dressing yourself?  No.    Are you currently engaging in any exercise or physical activity?  Yes.  Elliptical and walking   What is your perception of your health?  Good.    Advance Care Planning  Do you have an Advance Directive, Living Will, Durable Power of , or POLST? Yes          is on file    Health Maintenance Summary            Overdue - BONE DENSITY (Every 5 Years) Overdue - never done      No completion history exists for this topic.              Overdue - CERVICAL CANCER SCREENING (Every 3 Years) Overdue since 8/23/2020 08/23/2017  PAP IG (IMAGE GUIDED)    07/27/2015  PAP IG (IMAGE GUIDED)              Overdue - IMM PNEUMOCOCCAL VACCINE: 65+ Years (1 - PCV) Overdue - never done      No completion history exists for this topic.              Annual Wellness Visit (Every 366 Days) Next due on 3/10/2023      03/09/2022  Level of Service: [place] ANNUAL WELLNESS VISIT-INCLUDES PPPS SUBSEQUE*              COLORECTAL CANCER SCREENING (COLONOSCOPY - Every 5 Years) Next due on 1/3/2024      01/03/2019  REFERRAL TO GI FOR COLONOSCOPY    03/20/2009  AMB REFERRAL TO GI FOR COLONOSCOPY              MAMMOGRAM (Every 2 Years) Next due on 2/9/2024 02/09/2022  MA-SCREENING MAMMO BILAT W/IMPLANTS W/AKHIL W/CAD    12/14/2020  MA-SCREENING MAMMO BILAT W/IMPLANTS W/AKHIL W/CAD    12/12/2019  MA-SCREEN MAMMO W/IMPLANTS W/CAD-BILAT    10/12/2018  MA-SCREEN MAMMO W/IMPLANTS W/CAD-BILAT    08/02/2017  MA-MAMMO SCREEN BILAT IMPLANTS AKHIL CAD    Only the first 5 history entries  have been loaded, but more history exists.              IMM DTaP/Tdap/Td Vaccine (2 - Td or Tdap) Next due on 10/1/2030      10/01/2020  Imm Admin: Tdap Vaccine              HEPATITIS C SCREENING  Completed      01/22/2021  HEP C VIRUS ANTIBODY              IMM ZOSTER VACCINES (Series Information) Completed      02/06/2022  Imm Admin: Zoster Vaccine Recombinant (RZV) (SHINGRIX)    11/08/2021  Imm Admin: Zoster Vaccine Recombinant (RZV) (SHINGRIX)              IMM INFLUENZA (Series Information) Completed      11/10/2022  Outside Immunization: Fluzone High-Dose Quad    10/15/2021  Imm Admin: Influenza Vaccine Quad Inj (Pf)    10/01/2020  Imm Admin: Influenza Vaccine Adult HD    10/01/2020  Imm Admin: Influenza Vaccine Quad Inj (Pf)              COVID-19 Vaccine (Series Information) Completed      11/18/2022  Imm Admin: PFIZER BIVALENT BOOSTER SARS-COV-2 VACCINE (12+)    08/11/2022  Imm Admin: PFIZER HOFFMANN CAP SARS-COV-2 VACCINATION (12+)    12/16/2021  Imm Admin: PFIZER PURPLE CAP SARS-COV-2 VACCINATION (12+)    04/30/2021  Imm Admin: PFIZER PURPLE CAP SARS-COV-2 VACCINATION (12+)    04/07/2021  Imm Admin: PFIZER PURPLE CAP SARS-COV-2 VACCINATION (12+)              IMM HEP B VACCINE (Series Information) Aged Out      No completion history exists for this topic.              IMM MENINGOCOCCAL ACWY VACCINE (Series Information) Aged Out      No completion history exists for this topic.                    Patient Care Team:  Haley Jin M.D. as PCP - General (Family Medicine)  Светлана Schmitt M.D. as Consulting Physician (OB & Gyn - Gynecology)  [facility] (Inactive) as Consulting Physician (Gastroenterology)    Social History     Tobacco Use    Smoking status: Never    Smokeless tobacco: Never    Tobacco comments:     on and off as teen   Vaping Use    Vaping Use: Never used   Substance Use Topics    Alcohol use: Yes     Alcohol/week: 1.8 oz     Types: 3 Standard drinks or equivalent per week      "Comment: 3 drinks per week    Drug use: Not Currently     Family History   Problem Relation Age of Onset    Cancer Mother         BREAST, POST GLORY    Cancer Paternal Grandmother         breast    Diabetes Maternal Grandfather      She  has a past medical history of Complete tear of right rotator cuff (10/5/2017), High cholesterol, Hyperlipidemia, Pain, and Snoring.   Past Surgical History:   Procedure Laterality Date    SHOULDER DECOMPRESSION ARTHROSCOPIC Right 10/5/2017    Procedure: SHOULDER DECOMPRESSION ARTHROSCOPIC SUBACROMIAL;  Surgeon: Mimi Wood M.D.;  Location: SURGERY [facility];  Service: Orthopedics    DEBRIDEMENT, LABRUM, HIP, ARTHROSCOPIC Right 10/5/2017    Procedure: ARTHROSCOPIC LABRAL DEBRIDEMENT;  Surgeon: Mimi Wood M.D.;  Location: SURGERY [facility];  Service: Orthopedics    SHOULDER ARTHROSCOPY W/ ROTATOR CUFF REPAIR Right 10/5/2017    Procedure: SHOULDER ARTHROSCOPY W/ ROTATOR CUFF REPAIR;  Surgeon: Mimi Wood M.D.;  Location: SURGERY [facility];  Service: Orthopedics    HI BREAST AUGMENTATION WITH IMPLANT    1998    OTHER      liposuction       Exam:   BP (!) 142/90 (BP Location: Left arm, Patient Position: Sitting, BP Cuff Size: Adult)   Pulse 66   Temp 36.7 °C (98.1 °F) (Temporal)   Resp 16   Ht 1.549 m (5' 1\")   Wt 66.9 kg (147 lb 6.4 oz)   SpO2 99%  Body mass index is 27.85 kg/m².    Hearing good.    Dentition good  Alert, oriented in no acute distress.  Eye contact is good, speech goal directed, affect calm  Constitutional: She appears well-developed and well-nourished. She appears not diaphoretic. No distress.   HENT: Right Ear: External ear normal. Left Ear: External ear normal. Tympanic membranes clear and intact.   Nose: Nose normal.   Mouth/Throat: Oropharynx is clear and moist. No oropharyngeal exudate.     Eyes: Conjunctivae and extraocular motions are normal. Pupils are equal, round, and reactive to light. No scleral icterus. "   Neck: Normal range of motion. Neck supple. No thyromegaly present.   Cardiovascular: Normal rate, regular rhythm, normal heart sounds and intact distal pulses.  Exam reveals no gallop and no friction rub.  No murmur heard. No carotid bruits.   Pulmonary/Chest: Effort normal and breath sounds normal. No respiratory distress. She has no wheezes. She has no rales.   Abdominal: Soft. Bowel sounds are normal. She exhibits no distension and no mass. No tenderness. She has no rebound and no guarding.   Lymphadenopathy:  She has no cervical adenopathy.   Neurological: She is alert. She has normal reflexes. No cranial nerve deficit. She exhibits normal muscle tone.   Skin: Skin is warm and dry. No rash noted. She is not diaphoretic. No erythema.   Psychiatric: She has a normal mood and affect. Her behavior is normal.   Musculoskeletal: She exhibits no edema. Full strength throughout. 2+ DTR throughout.       Assessment and Plan. The following treatment and monitoring plan is recommended:     68 yo for annual wellness visit.    1. Medicare annual wellness visit, initial   Recommend healthy diet and routine exercise.  Recommend routine weightbearing exercise and balance exercises as tolerated.   Assess routine labs.       2. RUQ pain -improved.  Stable.  Follow-up as needed if recurrent or worsening symptoms.       3. Mass of right lung -stable.    Recommendations for IOC.  She will also plan to reschedule with pulmonary.  Continue routine follow-up is recommended.       4. Hypercholesteremia -stable.  Continue simvastatin 20 mg daily.  Recommend low-cholesterol, high-fiber diet and routine exercise. Comp Metabolic Panel    Lipid Profile    TSH WITH REFLEX TO FT4      5. Vitamin D insufficiency -on supplement therapy.  Monitor labs. VITAMIN D,25 HYDROXY (DEFICIENCY)      6. Leukopenia, unspecified type -otherwise asymptomatic.  Monitor labs. CBC WITH DIFFERENTIAL      7. History of colon polyps   Recommend routine follow-up  with GI.       8. History of osteopenia  DS-BONE DENSITY STUDY (DEXA)      9. Postmenopausal  DS-BONE DENSITY STUDY (DEXA)      10. Elevated blood pressure reading -mildly elevated.  Recommend keep blood pressure log.  Continue to monitor.       11. Screening for diabetes mellitus  Comp Metabolic Panel    HEMOGLOBIN A1C      12. Need for pneumococcal vaccination  Pneumococcal Conjugate Vaccine 20-Valent (19 yrs+)        Services suggested: No services needed at this time  Health Care Screening recommendations as per orders if indicated.  Referrals offered: PT/OT/Nutrition counseling/Behavioral Health/Smoking cessation as per orders if indicated.    Discussion today about general wellness and lifestyle habits:    Prevent falls and reduce trip hazards; Cautioned about securing or removing rugs.  Have a working fire alarm and carbon monoxide detector;   Engage in regular physical activity and social activities     Follow-up: Return in about 6 weeks (around 2/23/2023).    This medical record contains text that has been entered with the assistance of computer voice recognition and dictation software.  Therefore, it may contain unintended errors in text, spelling, punctuation, or grammar.

## 2023-01-17 DIAGNOSIS — R91.1 LUNG NODULE: ICD-10-CM

## 2023-01-17 NOTE — PROGRESS NOTES
Medical chart review.  Patient referred to [facility] for a lung nodule originally in November 2021.  Biopsy completed which was negative as well as PET scan did not show any significant uptake consistent with postinflammatory process.  Repeat CT on 6/6/2022 showed a decrease in size in the right lower lobe lung mass consistent with a benign process.  Recommended she have continued monitoring and referred patient to pulmonary nodule clinic.    It appears that patient has not established or scheduled with pulmonary at this time.  She was scheduled in November 2022 and canceled that appointment.  I will update patient's PCP and defer to her for continued management and monitoring.  I did place a referral to our high risk nurse navigator as well just for continued monitoring.

## 2023-01-26 ENCOUNTER — HOSPITAL ENCOUNTER (OUTPATIENT)
Dept: LAB | Facility: MEDICAL CENTER | Age: 68
End: 2023-01-26
Attending: FAMILY MEDICINE
Payer: MEDICARE

## 2023-01-26 DIAGNOSIS — E78.00 HYPERCHOLESTEREMIA: ICD-10-CM

## 2023-01-26 DIAGNOSIS — D72.819 LEUKOPENIA, UNSPECIFIED TYPE: ICD-10-CM

## 2023-01-26 DIAGNOSIS — E55.9 VITAMIN D INSUFFICIENCY: ICD-10-CM

## 2023-01-26 DIAGNOSIS — Z13.1 SCREENING FOR DIABETES MELLITUS: ICD-10-CM

## 2023-01-26 LAB
25(OH)D3 SERPL-MCNC: 39 NG/ML (ref 30–100)
ALBUMIN SERPL BCP-MCNC: 4.5 G/DL (ref 3.2–4.9)
ALBUMIN/GLOB SERPL: 1.5 G/DL
ALP SERPL-CCNC: 59 U/L (ref 30–99)
ALT SERPL-CCNC: 18 U/L (ref 2–50)
ANION GAP SERPL CALC-SCNC: 10 MMOL/L (ref 7–16)
AST SERPL-CCNC: 27 U/L (ref 12–45)
BASOPHILS # BLD AUTO: 0.4 % (ref 0–1.8)
BASOPHILS # BLD: 0.02 K/UL (ref 0–0.12)
BILIRUB SERPL-MCNC: 0.5 MG/DL (ref 0.1–1.5)
BUN SERPL-MCNC: 15 MG/DL (ref 8–22)
CALCIUM ALBUM COR SERPL-MCNC: 9.1 MG/DL (ref 8.5–10.5)
CALCIUM SERPL-MCNC: 9.5 MG/DL (ref 8.5–10.5)
CHLORIDE SERPL-SCNC: 104 MMOL/L (ref 96–112)
CHOLEST SERPL-MCNC: 233 MG/DL (ref 100–199)
CO2 SERPL-SCNC: 27 MMOL/L (ref 20–33)
CREAT SERPL-MCNC: 0.8 MG/DL (ref 0.5–1.4)
EOSINOPHIL # BLD AUTO: 0.12 K/UL (ref 0–0.51)
EOSINOPHIL NFR BLD: 2.6 % (ref 0–6.9)
ERYTHROCYTE [DISTWIDTH] IN BLOOD BY AUTOMATED COUNT: 48 FL (ref 35.9–50)
EST. AVERAGE GLUCOSE BLD GHB EST-MCNC: 114 MG/DL
GFR SERPLBLD CREATININE-BSD FMLA CKD-EPI: 81 ML/MIN/1.73 M 2
GLOBULIN SER CALC-MCNC: 3.1 G/DL (ref 1.9–3.5)
GLUCOSE SERPL-MCNC: 99 MG/DL (ref 65–99)
HBA1C MFR BLD: 5.6 % (ref 4–5.6)
HCT VFR BLD AUTO: 42 % (ref 37–47)
HDLC SERPL-MCNC: 80 MG/DL
HGB BLD-MCNC: 13.7 G/DL (ref 12–16)
IMM GRANULOCYTES # BLD AUTO: 0.01 K/UL (ref 0–0.11)
IMM GRANULOCYTES NFR BLD AUTO: 0.2 % (ref 0–0.9)
LDLC SERPL CALC-MCNC: 142 MG/DL
LYMPHOCYTES # BLD AUTO: 2.43 K/UL (ref 1–4.8)
LYMPHOCYTES NFR BLD: 52.8 % (ref 22–41)
MCH RBC QN AUTO: 30.4 PG (ref 27–33)
MCHC RBC AUTO-ENTMCNC: 32.6 G/DL (ref 33.6–35)
MCV RBC AUTO: 93.1 FL (ref 81.4–97.8)
MONOCYTES # BLD AUTO: 0.29 K/UL (ref 0–0.85)
MONOCYTES NFR BLD AUTO: 6.3 % (ref 0–13.4)
NEUTROPHILS # BLD AUTO: 1.73 K/UL (ref 2–7.15)
NEUTROPHILS NFR BLD: 37.7 % (ref 44–72)
NRBC # BLD AUTO: 0 K/UL
NRBC BLD-RTO: 0 /100 WBC
PLATELET # BLD AUTO: 246 K/UL (ref 164–446)
PMV BLD AUTO: 10.3 FL (ref 9–12.9)
POTASSIUM SERPL-SCNC: 4.6 MMOL/L (ref 3.6–5.5)
PROT SERPL-MCNC: 7.6 G/DL (ref 6–8.2)
RBC # BLD AUTO: 4.51 M/UL (ref 4.2–5.4)
SODIUM SERPL-SCNC: 141 MMOL/L (ref 135–145)
TRIGL SERPL-MCNC: 57 MG/DL (ref 0–149)
TSH SERPL DL<=0.005 MIU/L-ACNC: 2.47 UIU/ML (ref 0.38–5.33)
WBC # BLD AUTO: 4.6 K/UL (ref 4.8–10.8)

## 2023-01-26 PROCEDURE — 84443 ASSAY THYROID STIM HORMONE: CPT

## 2023-01-26 PROCEDURE — 36415 COLL VENOUS BLD VENIPUNCTURE: CPT

## 2023-01-26 PROCEDURE — 83036 HEMOGLOBIN GLYCOSYLATED A1C: CPT

## 2023-01-26 PROCEDURE — 80061 LIPID PANEL: CPT

## 2023-01-26 PROCEDURE — 82306 VITAMIN D 25 HYDROXY: CPT

## 2023-01-26 PROCEDURE — 80053 COMPREHEN METABOLIC PANEL: CPT

## 2023-01-26 PROCEDURE — 85025 COMPLETE CBC W/AUTO DIFF WBC: CPT

## 2023-01-30 ENCOUNTER — PATIENT OUTREACH (OUTPATIENT)
Dept: ONCOLOGY | Facility: MEDICAL CENTER | Age: 68
End: 2023-01-30
Payer: MEDICARE

## 2023-01-30 NOTE — PROGRESS NOTES
Chart review  High Risk Lung  6/6/23 - CT scan  Recommended continued monitoring and referred patient to pulmonary nodule clinic. Pt needs to reschedule.

## 2023-02-10 ENCOUNTER — OFFICE VISIT (OUTPATIENT)
Dept: MEDICAL GROUP | Facility: IMAGING CENTER | Age: 68
End: 2023-02-10
Payer: MEDICARE

## 2023-02-10 ENCOUNTER — HOSPITAL ENCOUNTER (OUTPATIENT)
Dept: RADIOLOGY | Facility: MEDICAL CENTER | Age: 68
End: 2023-02-10
Attending: FAMILY MEDICINE
Payer: MEDICARE

## 2023-02-10 VITALS
SYSTOLIC BLOOD PRESSURE: 122 MMHG | WEIGHT: 145 LBS | TEMPERATURE: 97.8 F | BODY MASS INDEX: 27.38 KG/M2 | RESPIRATION RATE: 16 BRPM | OXYGEN SATURATION: 98 % | DIASTOLIC BLOOD PRESSURE: 78 MMHG | HEART RATE: 76 BPM | HEIGHT: 61 IN

## 2023-02-10 DIAGNOSIS — R73.09 ELEVATED GLUCOSE: ICD-10-CM

## 2023-02-10 DIAGNOSIS — Z12.31 VISIT FOR SCREENING MAMMOGRAM: ICD-10-CM

## 2023-02-10 DIAGNOSIS — E78.00 HYPERCHOLESTEREMIA: ICD-10-CM

## 2023-02-10 DIAGNOSIS — E55.9 VITAMIN D INSUFFICIENCY: ICD-10-CM

## 2023-02-10 DIAGNOSIS — D72.819 LEUKOPENIA, UNSPECIFIED TYPE: ICD-10-CM

## 2023-02-10 PROCEDURE — 77063 BREAST TOMOSYNTHESIS BI: CPT

## 2023-02-10 PROCEDURE — 99214 OFFICE O/P EST MOD 30 MIN: CPT | Performed by: FAMILY MEDICINE

## 2023-02-10 RX ORDER — CHLORAL HYDRATE 500 MG
CAPSULE ORAL
COMMUNITY
End: 2023-03-23

## 2023-02-10 ASSESSMENT — FIBROSIS 4 INDEX: FIB4 SCORE: 1.68

## 2023-02-10 ASSESSMENT — PAIN SCALES - GENERAL: PAINLEVEL: NO PAIN

## 2023-02-10 NOTE — PROGRESS NOTES
SUBJECTIVE:    Chief Complaint   Patient presents with    Lab Results     Hypercholesteremia       HPI:     Cindi Cooper is a 67 y.o. female with hypercholesterolemia here for follow-up of lab work. Patient is here with her .      Hypercholesterolemia-she is on some statin 20 mg daily.  Tolerates medication well.  Increase in LDL to 142 compared to prior.  More chi is during the holidays, from October for about 3 months.  Wine during the holidays.  Needs yogurt in the mornings.  Lifestyle has been different since babysiing.  Has been traveling regularly.  She does exercise during the week but exercising less compared to prior.  Dog  in July and was previously walking her dog regularly.  Lifestyle is little different.    Glucose 99, A1c 5.6%.  Has had elevated glucose levels in the past noted.    White blood cell count slightly low.  No recent illness.    She will be plan to follow-up for her DEXA in the future.    She will be completing mammogram this afternoon.      ROS:  No recent fevers or chills. No nausea or vomiting. No diarrhea. No chest pains or shortness of breath. No lower extremity edema.    Current Outpatient Medications on File Prior to Visit   Medication Sig Dispense Refill    Omega-3 Fatty Acids (FISH OIL) 1000 MG Cap capsule Take  by mouth.      simvastatin (ZOCOR) 20 MG Tab TAKE 1 TABLET BY MOUTH EVERY DAY. IN THE EVENING 100 Tablet 0    vitamin D3 (CHOLECALCIFEROL) 400 UNIT Tab Take 1,000 Units by mouth every day.      Coenzyme Q10 (COQ10 PO) Take 1 Tablet by mouth every day.      Multiple Vitamin (MULTIVITAMIN ADULT PO) Take  by mouth.      VITAMIN E PO Take 1 Tablet by mouth every day.      Cholecalciferol (VITAMIN D PO) Take 1 Tab by mouth every day.      Ascorbic Acid (VITAMIN C PO) Take 1 Tab by mouth every day.      Cyanocobalamin (VITAMIN B 12 PO) Take 1 Tab by mouth every day.      CALCIUM PO Take 1 Tab by mouth every day.      Glucosamine-Chondroitin (GLUCOSAMINE  "CHONDR COMPLEX PO) Take 1 Tab by mouth every day.       No current facility-administered medications on file prior to visit.       Allergies   Allergen Reactions    Codeine Nausea       Patient Active Problem List    Diagnosis Date Noted    BMI 27.0-27.9,adult 03/09/2022    History of colon polyps 11/02/2018    Vitamin D insufficiency 03/30/2016    Hypercholesteremia 07/24/2015       Past Medical History:   Diagnosis Date    Complete tear of right rotator cuff 10/5/2017    High cholesterol     Hyperlipidemia     Pain     typical aches and pains with age    Snoring          OBJECTIVE:   /78 (BP Location: Left arm, Patient Position: Sitting, BP Cuff Size: Adult)   Pulse 76   Temp 36.6 °C (97.8 °F) (Temporal)   Resp 16   Ht 1.549 m (5' 1\")   Wt 65.8 kg (145 lb)   SpO2 98%   BMI 27.40 kg/m²   General: Well-developed well-nourished female, no acute distress  Cardiovascular: regular rate and rhythm, no murmurs, gallops, rubs  Lungs: clear to auscultation bilaterally, no wheezes, crackles, or rhonchi  Abdomen: soft, nontender, nondistended, no rebound, no guarding, no hepatosplenomegaly  Extremities: no cyanosis, clubbing, edema  Skin: Warm and dry  Psych: appropriate mood and affect       Latest Reference Range & Units 01/26/23 12:19   WBC 4.8 - 10.8 K/uL 4.6 (L)   RBC 4.20 - 5.40 M/uL 4.51   Hemoglobin 12.0 - 16.0 g/dL 13.7   Hematocrit 37.0 - 47.0 % 42.0   MCV 81.4 - 97.8 fL 93.1   MCH 27.0 - 33.0 pg 30.4   MCHC 33.6 - 35.0 g/dL 32.6 (L)   RDW 35.9 - 50.0 fL 48.0   Platelet Count 164 - 446 K/uL 246   MPV 9.0 - 12.9 fL 10.3   Neutrophils-Polys 44.00 - 72.00 % 37.70 (L)   Neutrophils (Absolute) 2.00 - 7.15 K/uL 1.73 (L)   Lymphocytes 22.00 - 41.00 % 52.80 (H)   Lymphs (Absolute) 1.00 - 4.80 K/uL 2.43   Monocytes 0.00 - 13.40 % 6.30   Monos (Absolute) 0.00 - 0.85 K/uL 0.29   Eosinophils 0.00 - 6.90 % 2.60   Eos (Absolute) 0.00 - 0.51 K/uL 0.12   Basophils 0.00 - 1.80 % 0.40   Baso (Absolute) 0.00 - 0.12 K/uL " 0.02   Immature Granulocytes 0.00 - 0.90 % 0.20   Immature Granulocytes (abs) 0.00 - 0.11 K/uL 0.01   Nucleated RBC /100 WBC 0.00   NRBC (Absolute) K/uL 0.00   Sodium 135 - 145 mmol/L 141   Potassium 3.6 - 5.5 mmol/L 4.6   Chloride 96 - 112 mmol/L 104   Co2 20 - 33 mmol/L 27   Anion Gap 7.0 - 16.0  10.0   Glucose 65 - 99 mg/dL 99   Bun 8 - 22 mg/dL 15   Creatinine 0.50 - 1.40 mg/dL 0.80   GFR (CKD-EPI) >60 mL/min/1.73 m 2 81   Calcium 8.5 - 10.5 mg/dL 9.5   Correct Calcium 8.5 - 10.5 mg/dL 9.1   AST(SGOT) 12 - 45 U/L 27   ALT(SGPT) 2 - 50 U/L 18   Alkaline Phosphatase 30 - 99 U/L 59   Total Bilirubin 0.1 - 1.5 mg/dL 0.5   Albumin 3.2 - 4.9 g/dL 4.5   Total Protein 6.0 - 8.2 g/dL 7.6   Globulin 1.9 - 3.5 g/dL 3.1   A-G Ratio g/dL 1.5   Glycohemoglobin 4.0 - 5.6 % 5.6   Estim. Avg Glu mg/dL 114   Cholesterol,Tot 100 - 199 mg/dL 233 (H)   Triglycerides 0 - 149 mg/dL 57   HDL >=40 mg/dL 80   LDL <100 mg/dL 142 (H)   25-Hydroxy   Vitamin D 25 30 - 100 ng/mL 39   TSH 0.380 - 5.330 uIU/mL 2.470   (L): Data is abnormally low  (H): Data is abnormally high      ASSESSMENT/PLAN:    67 y.o.female with hypercholesterolemia.       1. Hypercholesteremia  Comp Metabolic Panel    Lipid Profile      2. Elevated glucose  Comp Metabolic Panel    HEMOGLOBIN A1C      3. Leukopenia, unspecified type  CBC WITH DIFFERENTIAL      4. Vitamin D insufficiency        -Hypercholesterolemia-discussed options of therapy.    She will continue on simvastatin 20 mg daily at this time.  She will work on changes in diet and lifestyle which were further discussed in detail.  Plan to repeat labs in 6 months.  -Elevated glucose on prior labs.  Continue monitoring labs.  Counseled on recommendations for low sugar/low processed food diet and routine exercise.  -Leukopenia-mild.  Denies any recent illness.    Will continue monitor at this time.  Plan to repeat labs in 3 months and follow-up as needed at that time.  -Vitamin D insufficiency-improved and  stable.  Continue monitor in future.      Return in about 6 months (around 8/10/2023).    This medical record contains text that has been entered with the assistance of computer voice recognition and dictation software.  Therefore, it may contain unintended errors in text, spelling, punctuation, or grammar.

## 2023-02-21 PROBLEM — R91.8 MASS OF RIGHT LUNG: Status: ACTIVE | Noted: 2023-02-21

## 2023-03-16 ENCOUNTER — HOSPITAL ENCOUNTER (OUTPATIENT)
Dept: RADIOLOGY | Facility: MEDICAL CENTER | Age: 68
End: 2023-03-16
Attending: FAMILY MEDICINE
Payer: MEDICARE

## 2023-03-16 DIAGNOSIS — Z78.0 POSTMENOPAUSAL: ICD-10-CM

## 2023-03-16 DIAGNOSIS — Z87.39 HISTORY OF OSTEOPENIA: ICD-10-CM

## 2023-03-16 PROCEDURE — 77080 DXA BONE DENSITY AXIAL: CPT

## 2023-03-23 ENCOUNTER — OFFICE VISIT (OUTPATIENT)
Dept: MEDICAL GROUP | Facility: IMAGING CENTER | Age: 68
End: 2023-03-23
Payer: MEDICARE

## 2023-03-23 VITALS
HEART RATE: 58 BPM | HEIGHT: 61 IN | DIASTOLIC BLOOD PRESSURE: 80 MMHG | WEIGHT: 145 LBS | SYSTOLIC BLOOD PRESSURE: 132 MMHG | RESPIRATION RATE: 17 BRPM | BODY MASS INDEX: 27.38 KG/M2 | TEMPERATURE: 97.6 F | OXYGEN SATURATION: 100 %

## 2023-03-23 DIAGNOSIS — R91.8 MASS OF RIGHT LUNG: ICD-10-CM

## 2023-03-23 DIAGNOSIS — D72.819 LEUKOPENIA, UNSPECIFIED TYPE: ICD-10-CM

## 2023-03-23 DIAGNOSIS — E78.00 HYPERCHOLESTEREMIA: ICD-10-CM

## 2023-03-23 DIAGNOSIS — M85.852 OSTEOPENIA OF LEFT HIP: ICD-10-CM

## 2023-03-23 PROCEDURE — 99213 OFFICE O/P EST LOW 20 MIN: CPT | Performed by: FAMILY MEDICINE

## 2023-03-23 ASSESSMENT — FIBROSIS 4 INDEX: FIB4 SCORE: 1.71

## 2023-03-23 ASSESSMENT — PAIN SCALES - GENERAL: PAINLEVEL: NO PAIN

## 2023-03-23 NOTE — PROGRESS NOTES
SUBJECTIVE:    Chief Complaint   Patient presents with    Results     DEXA       HPI:     Cindi Cooper is a 68 y.o. female with hypercholesterolemia, vitamin D insufficiency, history of colon polyps and finding of right lung mass for which she has had evaluation who is here for follow-up of DEXA scan.    Osteopenia of left hip region noted.  Lumbar spine normal on recent DEXA scan.  2019- dexa per gynecology. Has at home.   States she has also completed her mammogram recently February 2022 which was normal.  Notes her blood pressure has been better.  Has been driving over the hill to help take care of the baby.  She will plan to follow-up with pulmonary regarding the prior right lung mass.  She did have some prior slightly low white blood cell count levels which she will have checked in the future.  She continues on simvastatin 20 mg daily for her cholesterol.      ROS:  No recent fevers or chills. No nausea or vomiting. No diarrhea. No chest pains or shortness of breath. No lower extremity edema.    Current Outpatient Medications on File Prior to Visit   Medication Sig Dispense Refill    simvastatin (ZOCOR) 20 MG Tab TAKE 1 TABLET BY MOUTH EVERY DAY. IN THE EVENING 100 Tablet 0    vitamin D3 (CHOLECALCIFEROL) 400 UNIT Tab Take 1,000 Units by mouth every day.      Coenzyme Q10 (COQ10 PO) Take 1 Tablet by mouth every day.      Multiple Vitamin (MULTIVITAMIN ADULT PO) Take  by mouth.      VITAMIN E PO Take 1 Tablet by mouth every day.      Cholecalciferol (VITAMIN D PO) Take 1 Tab by mouth every day.      Ascorbic Acid (VITAMIN C PO) Take 1 Tab by mouth every day.      Cyanocobalamin (VITAMIN B 12 PO) Take 1 Tab by mouth every day.      CALCIUM PO Take 1 Tab by mouth every day.      Glucosamine-Chondroitin (GLUCOSAMINE CHONDR COMPLEX PO) Take 1 Tab by mouth every day.       No current facility-administered medications on file prior to visit.       Allergies   Allergen Reactions    Codeine Nausea  "      Patient Active Problem List    Diagnosis Date Noted    Mass of right lung 02/21/2023    BMI 27.0-27.9,adult 03/09/2022    History of colon polyps 11/02/2018    Vitamin D insufficiency 03/30/2016    Hypercholesteremia 07/24/2015       Past Medical History:   Diagnosis Date    Complete tear of right rotator cuff 10/5/2017    High cholesterol     Hyperlipidemia     Pain     typical aches and pains with age    Snoring          OBJECTIVE:   /80 (BP Location: Left arm, Patient Position: Sitting, BP Cuff Size: Adult)   Pulse (!) 58   Temp 36.4 °C (97.6 °F) (Temporal)   Resp 17   Ht 1.549 m (5' 1\")   Wt 65.8 kg (145 lb)   SpO2 100%   BMI 27.40 kg/m²   General: Well-developed well-nourished female, no acute distress  Neck: supple, no lymphadenopathy- cervical or supraclavicular, no thyromegaly  Cardiovascular: regular rate and rhythm, no murmurs, gallops, rubs  Lungs: clear to auscultation bilaterally, no wheezes, crackles, or rhonchi  Abdomen: +bowel sounds, soft, nontender, nondistended, no rebound, no guarding, no hepatosplenomegaly  Extremities: no cyanosis, clubbing, edema  Skin: Warm and dry  Psych: appropriate mood and affect    Narrative & Impression     3/16/2023 9:18 AM     HISTORY/REASON FOR EXAM:  Postmenopausal     TECHNIQUE/EXAM DESCRIPTION AND NUMBER OF VIEWS:  Dual x-ray bone densitometry was performed from the L1 through L4 levels and from the proximal left femur utilizing the GE Prodigy unit.     COMPARISON: None     FINDINGS:  The lumbar spine has a mean bone mineral density of 1.091 g/cm2, with a T score of -0.7 and a Z score of 0.8.     The proximal left femur has a mean bone mineral density of 0.806 g/cm2, with a T score of -1.6 and a Z score of -0.4.        IMPRESSION:     According to the World Health Organization classification, bone mineral density of this patient is normal in the lumbar spine, osteopenic in the left femur.     10-year Probability of Fracture:  Major Osteoporotic  "    6.0%  Hip     0.9%  Population      [place] ()     Based on left femur neck BMD     INTERPRETING LOCATION: [address] [place] [address] [place], [id number]           Exam Ended: 03/16/23  9:26 AM Last Resulted: 03/16/23  5:08 PM             ASSESSMENT/PLAN:    68 y.o.female with hypercholesterolemia, history of colon polyps, vitamin D insufficiency and right lung mass for which she has been evaluated.      1. Osteopenia of left hip   Advised patient to upload prior DEXA to [employer].  Recommend continue routine weightbearing exercise, adequate calcium vitamin D intake.  Repeat DEXA in 2 years.  Continue to monitor.       2. Mass of right lung -has had prior evaluation.  Stable.  Patient to follow-up with pulmonary.       3. Leukopenia, unspecified   Plan to repeat labs in 3 months.       4. Hypercholesteremia   Repeat labs in 3 months.  Continue healthy diet and routine exercise.         She will plan to repeat labs in 3 months and follow-up at that time.  Return in about 3 months (around 6/23/2023).    This medical record contains text that has been entered with the assistance of computer voice recognition and dictation software.  Therefore, it may contain unintended errors in text, spelling, punctuation, or grammar.

## 2023-04-07 ENCOUNTER — OFFICE VISIT (OUTPATIENT)
Dept: SLEEP MEDICINE | Facility: MEDICAL CENTER | Age: 68
End: 2023-04-07
Attending: INTERNAL MEDICINE
Payer: MEDICARE

## 2023-04-07 VITALS
SYSTOLIC BLOOD PRESSURE: 118 MMHG | HEART RATE: 63 BPM | HEIGHT: 61 IN | OXYGEN SATURATION: 97 % | WEIGHT: 147 LBS | DIASTOLIC BLOOD PRESSURE: 82 MMHG | BODY MASS INDEX: 27.75 KG/M2

## 2023-04-07 DIAGNOSIS — R91.8 PULMONARY NODULES: ICD-10-CM

## 2023-04-07 DIAGNOSIS — R91.8 MASS OF RIGHT LUNG: ICD-10-CM

## 2023-04-07 PROCEDURE — 99204 OFFICE O/P NEW MOD 45 MIN: CPT | Performed by: INTERNAL MEDICINE

## 2023-04-07 PROCEDURE — 99213 OFFICE O/P EST LOW 20 MIN: CPT | Performed by: INTERNAL MEDICINE

## 2023-04-07 ASSESSMENT — ENCOUNTER SYMPTOMS
CHILLS: 0
DIZZINESS: 0
SHORTNESS OF BREATH: 0
PALPITATIONS: 0
HEADACHES: 0
WHEEZING: 0
FEVER: 0
HEARTBURN: 0
COUGH: 0

## 2023-04-07 ASSESSMENT — FIBROSIS 4 INDEX: FIB4 SCORE: 1.71

## 2023-04-07 NOTE — ASSESSMENT & PLAN NOTE
Stable and even smaller than on initial CT.  S/p biopsy with negative pathology for malignancy.  Patient is from [place] and frequently travels to [place].  Asymptomatic.  Possible dilated vessels around mass.  Plan:  - CTA to assess for possible AVMs  - Cocci, histo, blasto  - Follow up in May

## 2023-04-07 NOTE — PROGRESS NOTES
"Pulmonary Clinic- Initial Consult    Date of Service: 4/7/23    Referring Physician: Marry Carrion A.P.N.    Reason for Consult: Lung nodule    Chief Complaint:   Chief Complaint   Patient presents with    Establish Care     Referred by Marry AYALA for Pulmonary Nodule    Other     Ct-Chest 06/06/22       HPI:   Cindi Cooper is a 68 y.o. female who is followed by Haley Jin and is referred to the pulmonary clinic for pulmonary nodule/mass.  Patient is an otherwise healthy woman who presented with shortness of breath in October of 2021 thinking she was having a heart attack.  She was found to have an incidental pulmonary nodule at that time in the RLL near the diaphragm.  This was biopsied via CT guided biopsy 11/30/21 and was necrotic with calcifications and \"focally lined by mesothelium representing pleural surface\".  This has been followed with CT's until June of 2022 when it was noted to be shrinking.    Patient denies pulmonary history.  There is a family history of breast CA.  She is a non-smoker. She does have occasional PND with cough and allergies but no significant symptoms.      CT from June demonstrates mild focal bronchiectasis in the RML with some associated nodules that are smaller than prior as well as a large lobulated mass/nodule on the pleura at the right diaphragm.  There appears to be a few dilated vessels around and possibly entering the mass although it has been biopsied without significant bleeding which would go against AVM.  The mesothelial lining raises the possibility of a congenital abnormality.       Past Medical History:   Diagnosis Date    Complete tear of right rotator cuff 10/05/2017    High cholesterol     Hyperlipidemia     Pain     typical aches and pains with age    Snoring        Past Surgical History:   Procedure Laterality Date    SHOULDER DECOMPRESSION ARTHROSCOPIC Right 10/5/2017    Procedure: SHOULDER DECOMPRESSION ARTHROSCOPIC SUBACROMIAL;  Surgeon: " Mimi Wood M.D.;  Location: SURGERY [facility];  Service: Orthopedics    DEBRIDEMENT, LABRUM, HIP, ARTHROSCOPIC Right 10/5/2017    Procedure: ARTHROSCOPIC LABRAL DEBRIDEMENT;  Surgeon: Mimi Wood M.D.;  Location: SURGERY [facility];  Service: Orthopedics    SHOULDER ARTHROSCOPY W/ ROTATOR CUFF REPAIR Right 10/5/2017    Procedure: SHOULDER ARTHROSCOPY W/ ROTATOR CUFF REPAIR;  Surgeon: Mimi Wood M.D.;  Location: SURGERY [facility];  Service: Orthopedics    NC BREAST AUGMENTATION WITH IMPLANT    1998    OTHER      liposuction       Social History     Socioeconomic History    Marital status:      Spouse name: Not on file    Number of children: Not on file    Years of education: Not on file    Highest education level: Not on file   Occupational History    Occupation:    Tobacco Use    Smoking status: Never     Passive exposure: Never    Smokeless tobacco: Never    Tobacco comments:     on and off as teen   Vaping Use    Vaping Use: Never used   Substance and Sexual Activity    Alcohol use: Yes     Alcohol/week: 1.8 oz     Types: 3 Standard drinks or equivalent per week     Comment: 3 drinks per week    Drug use: Not Currently    Sexual activity: Not Currently     Partners: Male   Other Topics Concern    Not on file   Social History Narrative    , 2 children.     Retired mortgage business.     Social Determinants of Health     Financial Resource Strain: Not on file   Food Insecurity: Not on file   Transportation Needs: Not on file   Physical Activity: Not on file   Stress: Not on file   Social Connections: Not on file   Intimate Partner Violence: Not on file   Housing Stability: Not on file          Family History   Problem Relation Age of Onset    Cancer Mother         BREAST, POST GLORY    Cancer Paternal Grandmother         breast    Diabetes Maternal Grandfather        Current Outpatient Medications on File Prior to Visit   Medication Sig Dispense  "Refill    simvastatin (ZOCOR) 20 MG Tab TAKE 1 TABLET BY MOUTH EVERY DAY. IN THE EVENING 100 Tablet 3    vitamin D3 (CHOLECALCIFEROL) 400 UNIT Tab Take 1,000 Units by mouth every day.      Coenzyme Q10 (COQ10 PO) Take 1 Tablet by mouth every day.      Multiple Vitamin (MULTIVITAMIN ADULT PO) Take  by mouth.      VITAMIN E PO Take 1 Tablet by mouth every day.      Cholecalciferol (VITAMIN D PO) Take 1 Tab by mouth every day.      Ascorbic Acid (VITAMIN C PO) Take 1 Tab by mouth every day.      Cyanocobalamin (VITAMIN B 12 PO) Take 1 Tab by mouth every day.      CALCIUM PO Take 1 Tab by mouth every day.      Glucosamine-Chondroitin (GLUCOSAMINE CHONDR COMPLEX PO) Take 1 Tab by mouth every day.       No current facility-administered medications on file prior to visit.       Allergies: Codeine      ROS:   Review of Systems   Constitutional:  Negative for chills, fever and malaise/fatigue.   HENT:  Negative for congestion.    Respiratory:  Negative for cough, shortness of breath and wheezing.    Cardiovascular:  Negative for chest pain, palpitations and leg swelling.   Gastrointestinal:  Negative for heartburn.   Neurological:  Negative for dizziness and headaches.   Endo/Heme/Allergies:  Positive for environmental allergies.     Vitals:  /82 (BP Location: Left arm, Patient Position: Sitting, BP Cuff Size: Adult)   Pulse 63   Ht 1.549 m (5' 1\")   Wt 66.7 kg (147 lb)   SpO2 97%     Physical Exam:  Physical Exam  Constitutional:       Appearance: Normal appearance.   HENT:      Head: Atraumatic.      Mouth/Throat:      Mouth: Mucous membranes are dry.   Eyes:      Extraocular Movements: Extraocular movements intact.   Cardiovascular:      Rate and Rhythm: Normal rate and regular rhythm.      Heart sounds: Normal heart sounds.   Pulmonary:      Effort: No respiratory distress.      Breath sounds: Normal breath sounds. No wheezing or rales.   Abdominal:      Palpations: Abdomen is soft.   Musculoskeletal:         " General: No swelling, tenderness or deformity.   Skin:     General: Skin is dry.   Neurological:      General: No focal deficit present.      Mental Status: She is alert and oriented to person, place, and time.         Vaccinations:    Pneumovax: Prevnar 20 2023  Covid: Vaccinated + 11/22  Annual Flu: 11/22    Pertinent Studies:  Laboratory Data:    6MWT:    PFTs as reviewed by me personally show:    Imaging as reviewed by me personally show:    CT 6/22:  IMPRESSION:        1. The mass in the right lower lobe is smaller than prior.     2. Unchanged cluster of tiny nodules in the right upper lobe with associated cystic change, likely postinflammatory changes.     3. No new or suspicious pulmonary nodule.    Pertinent Cardiac Studies:  Echo:      Assessment/Plan:    Problem List Items Addressed This Visit       Mass of right lung     Stable and even smaller than on initial CT.  S/p biopsy with negative pathology for malignancy.  Patient is from [place] and frequently travels to [place].  Asymptomatic.  Possible dilated vessels around mass.  Plan:  - CTA to assess for possible AVMs  - Cocci, histo, blasto  - Follow up in May          Other Visit Diagnoses       Pulmonary nodules        Relevant Orders    Coccidioides Antibodies Panel, Serum    Histoplasmosis Ab CF    BLASTOMYCES AB WITH REFLEX, SERUM    CT-CTA CHEST PULMONARY ARTERY W/ RECONS             Return in about 6 weeks (around 5/19/2023) for Any MD.     This note was generated using voice recognition software which has a chance of producing errors of grammar and possibly content.  I have made every reasonable attempt to find and correct any obvious errors, but it should be expected that some may not be found prior to finalization of this note.    Time spent in record review prior to patient arrival, reviewing results, and in face-to-face encounter totaled 45 min, excluding any procedures if performed.      Elizabeth Ryder MD RD  Pulmonary and Critical Care  [facility]

## 2023-04-13 ENCOUNTER — HOSPITAL ENCOUNTER (OUTPATIENT)
Dept: LAB | Facility: MEDICAL CENTER | Age: 68
End: 2023-04-13
Attending: INTERNAL MEDICINE
Payer: MEDICARE

## 2023-04-13 ENCOUNTER — HOSPITAL ENCOUNTER (OUTPATIENT)
Dept: LAB | Facility: MEDICAL CENTER | Age: 68
End: 2023-04-13
Attending: FAMILY MEDICINE
Payer: MEDICARE

## 2023-04-13 DIAGNOSIS — R91.8 PULMONARY NODULES: ICD-10-CM

## 2023-04-13 PROCEDURE — 86635 COCCIDIOIDES ANTIBODY: CPT | Mod: 91

## 2023-04-13 PROCEDURE — 36415 COLL VENOUS BLD VENIPUNCTURE: CPT

## 2023-04-13 PROCEDURE — 86612 BLASTOMYCES ANTIBODY: CPT

## 2023-04-13 PROCEDURE — 86698 HISTOPLASMA ANTIBODY: CPT

## 2023-04-16 LAB
B DERMAT AB SER-ACNC: 0.6 IV
H CAPSUL MYC AB TITR SER CF: NORMAL {TITER}
H CAPSUL YST AB TITR SER CF: NORMAL {TITER}

## 2023-04-18 LAB
C IMMITIS AB SER QL ID: NOT DETECTED
COCCIDIOIDES AB TITR SER CF: NORMAL {TITER}
COCCIDIOIDES IGG SER-ACNC: 0.1 IV
COCCIDIOIDES IGM SERPL-ACNC: 0.2 IV

## 2023-04-20 ENCOUNTER — APPOINTMENT (OUTPATIENT)
Dept: RADIOLOGY | Facility: MEDICAL CENTER | Age: 68
End: 2023-04-20
Attending: INTERNAL MEDICINE
Payer: MEDICARE

## 2023-05-03 DIAGNOSIS — R91.8 ABNORMAL CT SCAN OF LUNG: ICD-10-CM

## 2023-05-11 ENCOUNTER — HOSPITAL ENCOUNTER (OUTPATIENT)
Dept: LAB | Facility: MEDICAL CENTER | Age: 68
End: 2023-05-11
Attending: INTERNAL MEDICINE
Payer: MEDICARE

## 2023-05-11 DIAGNOSIS — D72.819 LEUKOPENIA, UNSPECIFIED TYPE: ICD-10-CM

## 2023-05-11 LAB
BASOPHILS # BLD AUTO: 0.6 % (ref 0–1.8)
BASOPHILS # BLD: 0.03 K/UL (ref 0–0.12)
EOSINOPHIL # BLD AUTO: 0.1 K/UL (ref 0–0.51)
EOSINOPHIL NFR BLD: 2 % (ref 0–6.9)
ERYTHROCYTE [DISTWIDTH] IN BLOOD BY AUTOMATED COUNT: 48.5 FL (ref 35.9–50)
HCT VFR BLD AUTO: 41.5 % (ref 37–47)
HGB BLD-MCNC: 13.9 G/DL (ref 12–16)
IMM GRANULOCYTES # BLD AUTO: 0.01 K/UL (ref 0–0.11)
IMM GRANULOCYTES NFR BLD AUTO: 0.2 % (ref 0–0.9)
LYMPHOCYTES # BLD AUTO: 2.92 K/UL (ref 1–4.8)
LYMPHOCYTES NFR BLD: 58.2 % (ref 22–41)
MCH RBC QN AUTO: 31.3 PG (ref 27–33)
MCHC RBC AUTO-ENTMCNC: 33.5 G/DL (ref 33.6–35)
MCV RBC AUTO: 93.5 FL (ref 81.4–97.8)
MONOCYTES # BLD AUTO: 0.33 K/UL (ref 0–0.85)
MONOCYTES NFR BLD AUTO: 6.6 % (ref 0–13.4)
NEUTROPHILS # BLD AUTO: 1.63 K/UL (ref 2–7.15)
NEUTROPHILS NFR BLD: 32.4 % (ref 44–72)
NRBC # BLD AUTO: 0 K/UL
NRBC BLD-RTO: 0 /100 WBC
PLATELET # BLD AUTO: 240 K/UL (ref 164–446)
PMV BLD AUTO: 10.2 FL (ref 9–12.9)
RBC # BLD AUTO: 4.44 M/UL (ref 4.2–5.4)
WBC # BLD AUTO: 5 K/UL (ref 4.8–10.8)

## 2023-05-11 PROCEDURE — 36415 COLL VENOUS BLD VENIPUNCTURE: CPT

## 2023-05-11 PROCEDURE — 85025 COMPLETE CBC W/AUTO DIFF WBC: CPT

## 2023-05-12 ENCOUNTER — HOSPITAL ENCOUNTER (OUTPATIENT)
Dept: RADIOLOGY | Facility: MEDICAL CENTER | Age: 68
End: 2023-05-12
Attending: INTERNAL MEDICINE
Payer: MEDICARE

## 2023-05-12 DIAGNOSIS — R91.8 PULMONARY NODULES: ICD-10-CM

## 2023-05-12 PROCEDURE — 71260 CT THORAX DX C+: CPT

## 2023-05-12 PROCEDURE — 700117 HCHG RX CONTRAST REV CODE 255: Performed by: INTERNAL MEDICINE

## 2023-05-12 RX ADMIN — IOHEXOL 75 ML: 350 INJECTION, SOLUTION INTRAVENOUS at 09:49

## 2023-05-23 ENCOUNTER — HOSPITAL ENCOUNTER (OUTPATIENT)
Dept: LAB | Facility: MEDICAL CENTER | Age: 68
End: 2023-05-23
Attending: INTERNAL MEDICINE
Payer: MEDICARE

## 2023-05-23 DIAGNOSIS — R91.8 ABNORMAL CT SCAN OF LUNG: ICD-10-CM

## 2023-05-23 LAB
ALBUMIN SERPL BCP-MCNC: 4.4 G/DL (ref 3.2–4.9)
ALBUMIN/GLOB SERPL: 1.5 G/DL
ALP SERPL-CCNC: 59 U/L (ref 30–99)
ALT SERPL-CCNC: 12 U/L (ref 2–50)
ANION GAP SERPL CALC-SCNC: 10 MMOL/L (ref 7–16)
AST SERPL-CCNC: 21 U/L (ref 12–45)
BILIRUB SERPL-MCNC: 0.5 MG/DL (ref 0.1–1.5)
BUN SERPL-MCNC: 16 MG/DL (ref 8–22)
CALCIUM ALBUM COR SERPL-MCNC: 8.8 MG/DL (ref 8.5–10.5)
CALCIUM SERPL-MCNC: 9.1 MG/DL (ref 8.5–10.5)
CHLORIDE SERPL-SCNC: 101 MMOL/L (ref 96–112)
CO2 SERPL-SCNC: 27 MMOL/L (ref 20–33)
CREAT SERPL-MCNC: 0.88 MG/DL (ref 0.5–1.4)
GFR SERPLBLD CREATININE-BSD FMLA CKD-EPI: 72 ML/MIN/1.73 M 2
GLOBULIN SER CALC-MCNC: 2.9 G/DL (ref 1.9–3.5)
GLUCOSE SERPL-MCNC: 93 MG/DL (ref 65–99)
POTASSIUM SERPL-SCNC: 4.4 MMOL/L (ref 3.6–5.5)
PROT SERPL-MCNC: 7.3 G/DL (ref 6–8.2)
SODIUM SERPL-SCNC: 138 MMOL/L (ref 135–145)

## 2023-05-23 PROCEDURE — 80053 COMPREHEN METABOLIC PANEL: CPT

## 2023-05-23 PROCEDURE — 36415 COLL VENOUS BLD VENIPUNCTURE: CPT

## 2023-05-26 ENCOUNTER — OFFICE VISIT (OUTPATIENT)
Dept: SLEEP MEDICINE | Facility: MEDICAL CENTER | Age: 68
End: 2023-05-26
Attending: INTERNAL MEDICINE
Payer: MEDICARE

## 2023-05-26 VITALS
BODY MASS INDEX: 27.19 KG/M2 | DIASTOLIC BLOOD PRESSURE: 72 MMHG | WEIGHT: 144 LBS | HEART RATE: 63 BPM | OXYGEN SATURATION: 100 % | HEIGHT: 61 IN | SYSTOLIC BLOOD PRESSURE: 122 MMHG

## 2023-05-26 DIAGNOSIS — R91.8 ABNORMAL CT SCAN OF LUNG: ICD-10-CM

## 2023-05-26 DIAGNOSIS — R91.8 MASS OF RIGHT LUNG: ICD-10-CM

## 2023-05-26 PROCEDURE — 3078F DIAST BP <80 MM HG: CPT | Performed by: INTERNAL MEDICINE

## 2023-05-26 PROCEDURE — 99213 OFFICE O/P EST LOW 20 MIN: CPT | Performed by: INTERNAL MEDICINE

## 2023-05-26 PROCEDURE — 3074F SYST BP LT 130 MM HG: CPT | Performed by: INTERNAL MEDICINE

## 2023-05-26 ASSESSMENT — FIBROSIS 4 INDEX: FIB4 SCORE: 1.67

## 2023-05-26 NOTE — PROGRESS NOTES
"Chief Complaint   Patient presents with    Pulmonary Nodule     Last seen 04/07/23    Results     labs 04/13/23, CT-Chest 05/12/23       Problems:   1. Mass of right lung    2. Abnormal CT scan of lung        Assessment/Plan:   # RLL mass   -- Unclear etiology. Lesion was biopsied which was negative for malignancy back in 11/2021.   -- Repeat CT chest showed no change in RLL mass size which is suggestive of a benign lesion   -- No B symptoms  -- Will obtain repeat CT chest in 6 months to complete 2 year follow up per Fleischner criteria     # Bronchiectasis   -- Stable RML bronchiectasis   -- Asymptomatic   -- Check connective panel      RTC 6 months after CT chest     HPI:  Cindi Cooper is a 68 y.o. female who is followed by Haley Jin and is referred to the pulmonary clinic for pulmonary nodule/mass.  Patient is an otherwise healthy woman who presented with shortness of breath in October of 2021 thinking she was having a heart attack.  She was found to have an incidental pulmonary nodule at that time in the RLL near the diaphragm.  This was biopsied via CT guided biopsy 11/30/21 and was necrotic with calcifications and \"focally lined by mesothelium representing pleural surface\".  This has been followed with CT's until June of 2022 when it was noted to be shrinking.     Patient denies pulmonary history.  There is a family history of breast CA.  She is a non-smoker. She does have occasional PND with cough and allergies but no significant symptoms.       CT from June demonstrates mild focal bronchiectasis in the RML with some associated nodules that are smaller than prior as well as a large lobulated mass/nodule on the pleura at the right diaphragm.  There appears to be a few dilated vessels around and possibly entering the mass although it has been biopsied without significant bleeding which would go against AVM.  The mesothelial lining raises the possibility of a congenital abnormality.     Interval " Follow Up Visit:    Presents today for follow up. Patient underwent repeat CT chest which showed no significant change to focal RLL consolidation. Subjectively she reports no complaints. Denies chest pain, N/V, fever/chills, weight loss, or abdominal pain.      Past Medical History:   Diagnosis Date    Complete tear of right rotator cuff 10/05/2017    High cholesterol     Hyperlipidemia     Pain     typical aches and pains with age    Snoring        Past Surgical History:   Procedure Laterality Date    SHOULDER DECOMPRESSION ARTHROSCOPIC Right 10/5/2017    Procedure: SHOULDER DECOMPRESSION ARTHROSCOPIC SUBACROMIAL;  Surgeon: Mimi Wood M.D.;  Location: [facility];  Service: Orthopedics    DEBRIDEMENT, LABRUM, HIP, ARTHROSCOPIC Right 10/5/2017    Procedure: ARTHROSCOPIC LABRAL DEBRIDEMENT;  Surgeon: Mimi Wood M.D.;  Location: [facility];  Service: Orthopedics    SHOULDER ARTHROSCOPY W/ ROTATOR CUFF REPAIR Right 10/5/2017    Procedure: SHOULDER ARTHROSCOPY W/ ROTATOR CUFF REPAIR;  Surgeon: Mimi Wood M.D.;  Location: [facility];  Service: Orthopedics    ME BREAST AUGMENTATION WITH IMPLANT    1998    OTHER      liposuction       ROS:   Constitutional: Denies fevers, chills, night sweats, fatigue or weight loss  Eyes: Denies vision loss, pain, drainage, double vision  Ears, Nose, Throat: Denies earache, tinnitus, hoarseness  Cardiovascular: Denies chest pain, tightness, palpitations  Respiratory: See HPI  GI: Denies abdominal pain, nausea, vomiting, diarrhea  : Denies frequent urination, hematuria, painful urination  Musculoskeletal: Denies back pain, painful joints, sore muscles  Neurological: Denies headaches, seizures  Skin: Denies rashes, color changes  Psychiatric: Denies depression or thoughts of suicide  Hematologic: Denies bleeding tendency or clotting tendency  Allergic/Immunologic: Denies rhinitis, skin sensitivity    Social History      Socioeconomic History    Marital status:      Spouse name: Not on file    Number of children: Not on file    Years of education: Not on file    Highest education level: Not on file   Occupational History    Occupation:    Tobacco Use    Smoking status: Never     Passive exposure: Never    Smokeless tobacco: Never    Tobacco comments:     on and off as teen   Vaping Use    Vaping Use: Never used   Substance and Sexual Activity    Alcohol use: Yes     Alcohol/week: 1.8 oz     Types: 3 Standard drinks or equivalent per week     Comment: 3 drinks per week    Drug use: Not Currently    Sexual activity: Not Currently     Partners: Male   Other Topics Concern    Not on file   Social History Narrative    , 2 children.     Retired mortgage business.     Social Determinants of Health     Financial Resource Strain: Not on file   Food Insecurity: Not on file   Transportation Needs: Not on file   Physical Activity: Sufficiently Active (10/1/2020)    Exercise Vital Sign     Days of Exercise per Week: 7 days     Minutes of Exercise per Session: 50 min   Stress: No Stress Concern Present (10/1/2020)    [employer] [place] [employer] - Occupational Stress Questionnaire     Feeling of Stress : Not at all   Social Connections: Not on file   Intimate Partner Violence: Not on file   Housing Stability: Not on file     Codeine  Current Outpatient Medications on File Prior to Visit   Medication Sig Dispense Refill    simvastatin (ZOCOR) 20 MG Tab TAKE 1 TABLET BY MOUTH EVERY DAY. IN THE EVENING 100 Tablet 3    Coenzyme Q10 (COQ10 PO) Take 1 Tablet by mouth every day.      Multiple Vitamin (MULTIVITAMIN ADULT PO) Take  by mouth.      VITAMIN E PO Take 1 Tablet by mouth every day.      Cholecalciferol (VITAMIN D PO) Take 1 Tab by mouth every day.      Ascorbic Acid (VITAMIN C PO) Take 1 Tab by mouth every day.      CALCIUM PO Take 1 Tab by mouth every day.      Glucosamine-Chondroitin (GLUCOSAMINE  "CHONDR COMPLEX PO) Take 1 Tab by mouth every day.      vitamin D3 (CHOLECALCIFEROL) 400 UNIT Tab Take 1,000 Units by mouth every day.      Cyanocobalamin (VITAMIN B 12 PO) Take 1 Tab by mouth every day.       No current facility-administered medications on file prior to visit.     /72 (BP Location: Left arm, Patient Position: Sitting, BP Cuff Size: Adult)   Pulse 63   Ht 1.549 m (5' 1\")   Wt 65.3 kg (144 lb)   SpO2 100%   Family History   Problem Relation Age of Onset    Cancer Mother         BREAST, POST GLORY    Cancer Paternal Grandmother         breast    Diabetes Maternal Grandfather      Immunization History   Administered Date(s) Administered    Influenza Vaccine Adult HD 10/01/2020, 11/10/2022    Influenza Vaccine Quad Inj (Pf) 10/01/2020, 10/15/2021    PFIZER BIVALENT SARS-COV-2 VACCINE (12+) 11/18/2022    PFIZER HOFFMANN CAP SARS-COV-2 VACCINATION (12+) 08/11/2022    PFIZER PURPLE CAP SARS-COV-2 VACCINATION (12+) 04/07/2021, 04/30/2021, 12/16/2021    Pneumococcal Conjugate Vaccine (PCV20) 01/12/2023    Tdap Vaccine 10/01/2020    Zoster Vaccine Recombinant (RZV) (SHINGRIX) 11/08/2021, 02/06/2022         Physical Exam:  General: NAD   HEENT: PERRLA, EOMI, no scleral icterus, no nasal or oral lesions  Neck: No thyromegaly, no adenopathy, no bruits  Mallampatti: Grade II  Lungs: Equal breath sounds, no wheezes or crackles  Heart: Regular rate and rhythm, no gallops or murmurs  Abdomen: Soft, benign, no organomegaly  Extremities: No clubbing, cyanosis, or edema  Neurologic: Cranial nerve, motor, and sensory exam are normal    Alix Parsons MD   Pulmonary Critical Care   [facility]           "

## 2023-05-31 ENCOUNTER — TELEPHONE (OUTPATIENT)
Dept: HEALTH INFORMATION MANAGEMENT | Facility: OTHER | Age: 68
End: 2023-05-31
Payer: MEDICARE

## 2023-07-05 ENCOUNTER — DOCUMENTATION (OUTPATIENT)
Dept: HEALTH INFORMATION MANAGEMENT | Facility: OTHER | Age: 68
End: 2023-07-05
Payer: MEDICARE

## 2023-07-21 ENCOUNTER — PATIENT OUTREACH (OUTPATIENT)
Dept: ONCOLOGY | Facility: MEDICAL CENTER | Age: 68
End: 2023-07-21
Payer: MEDICARE

## 2023-07-21 NOTE — PROGRESS NOTES
Chart review for High Risk Lung    High Risk Lung  Last CT 5/12/23    Established with pulmonary/pulmonary nodule clinic:  yes, pulmonary following    Next pulmonary: 12/11/2023    Management/recommendation: 6 month CT scan

## 2023-08-30 ENCOUNTER — OFFICE VISIT (OUTPATIENT)
Dept: MEDICAL GROUP | Facility: IMAGING CENTER | Age: 68
End: 2023-08-30
Payer: MEDICARE

## 2023-08-30 VITALS
TEMPERATURE: 97.9 F | DIASTOLIC BLOOD PRESSURE: 80 MMHG | BODY MASS INDEX: 27.56 KG/M2 | SYSTOLIC BLOOD PRESSURE: 144 MMHG | WEIGHT: 146 LBS | OXYGEN SATURATION: 98 % | HEART RATE: 62 BPM | HEIGHT: 61 IN | RESPIRATION RATE: 15 BRPM

## 2023-08-30 DIAGNOSIS — E78.00 HYPERCHOLESTEREMIA: ICD-10-CM

## 2023-08-30 DIAGNOSIS — Z71.84 ENCOUNTER FOR COUNSELING FOR TRAVEL: ICD-10-CM

## 2023-08-30 DIAGNOSIS — R03.0 ELEVATED BLOOD PRESSURE READING: ICD-10-CM

## 2023-08-30 PROCEDURE — 1126F AMNT PAIN NOTED NONE PRSNT: CPT | Performed by: FAMILY MEDICINE

## 2023-08-30 PROCEDURE — 3077F SYST BP >= 140 MM HG: CPT | Performed by: FAMILY MEDICINE

## 2023-08-30 PROCEDURE — 99213 OFFICE O/P EST LOW 20 MIN: CPT | Performed by: FAMILY MEDICINE

## 2023-08-30 PROCEDURE — 3079F DIAST BP 80-89 MM HG: CPT | Performed by: FAMILY MEDICINE

## 2023-08-30 RX ORDER — ATOVAQUONE AND PROGUANIL HYDROCHLORIDE 250; 100 MG/1; MG/1
TABLET, FILM COATED ORAL
Qty: 26 TABLET | Refills: 0 | Status: SHIPPED | OUTPATIENT
Start: 2023-08-30 | End: 2023-09-01

## 2023-08-30 ASSESSMENT — PAIN SCALES - GENERAL: PAINLEVEL: NO PAIN

## 2023-08-30 ASSESSMENT — FIBROSIS 4 INDEX: FIB4 SCORE: 1.67

## 2023-08-30 NOTE — PROGRESS NOTES
SUBJECTIVE:    Chief Complaint   Patient presents with    Medication Management     For traveling - malaria   Malarone       HPI:     Cindi Cooper is a 68 y.o. female here for request of travel medication.   Going to [place]. Needs anti-malarial therapy.   Saw  [facility] [place] [facility] [place] in visit with RN, had vaccination completed.   Travel for 17 days.   Elevated BP, not monitoring routinely currently his blood pressure was good when she saw pulmonology. [place] in [place] recently which may be contributing.   She will plan to complete repeat lipids in future and follow-up at that time.    ROS:  No recent fevers or chills. No nausea or vomiting. No diarrhea. No chest pains or shortness of breath. No lower extremity edema.    Current Outpatient Medications on File Prior to Visit   Medication Sig Dispense Refill    simvastatin (ZOCOR) 20 MG Tab TAKE 1 TABLET BY MOUTH EVERY DAY. IN THE EVENING 100 Tablet 3    vitamin D3 (CHOLECALCIFEROL) 400 UNIT Tab Take 1,000 Units by mouth every day.      Coenzyme Q10 (COQ10 PO) Take 1 Tablet by mouth every day.      Multiple Vitamin (MULTIVITAMIN ADULT PO) Take  by mouth.      VITAMIN E PO Take 1 Tablet by mouth every day.      Cholecalciferol (VITAMIN D PO) Take 1 Tab by mouth every day.      Ascorbic Acid (VITAMIN C PO) Take 1 Tab by mouth every day.      Cyanocobalamin (VITAMIN B 12 PO) Take 1 Tab by mouth every day.      CALCIUM PO Take 1 Tab by mouth every day.      Glucosamine-Chondroitin (GLUCOSAMINE CHONDR COMPLEX PO) Take 1 Tab by mouth every day.       No current facility-administered medications on file prior to visit.       Allergies   Allergen Reactions    Codeine Nausea       Patient Active Problem List    Diagnosis Date Noted    Mass of right lung 02/21/2023    BMI 27.0-27.9,adult 03/09/2022    History of colon polyps 11/02/2018    Vitamin D insufficiency 03/30/2016    Hypercholesteremia 07/24/2015       Past Medical History:   Diagnosis Date    Complete tear  "of right rotator cuff 10/05/2017    High cholesterol     Hyperlipidemia     Pain     typical aches and pains with age    Snoring          OBJECTIVE:   BP (!) 144/80 (BP Location: Left arm, Patient Position: Sitting, BP Cuff Size: Adult)   Pulse 62   Temp 36.6 °C (97.9 °F) (Temporal)   Resp 15   Ht 1.549 m (5' 1\")   Wt 66.2 kg (146 lb)   SpO2 98%   BMI 27.59 kg/m²   General: Well-developed well-nourished female, no acute distress  Neck: supple, no lymphadenopathy- cervical or supraclavicular, no thyromegaly  Cardiovascular: regular rate and rhythm, no murmurs, gallops, rubs  Lungs: clear to auscultation bilaterally, no wheezes, crackles, or rhonchi  Abdomen: +bowel sounds, soft, nontender, nondistended, no rebound, no guarding, no hepatosplenomegaly  Extremities: no cyanosis, clubbing, edema  Skin: Warm and dry  Psych: appropriate mood and affect      ASSESSMENT/PLAN:    68 y.o.female       1. Encounter for counseling for travel   Reviewed instructions for medication use.  Reviewed potential side effects of medication. atovaquone-proguanil (MALARONE) 250-100 MG per tablet      2. Elevated blood pressure reading -mildly elevated today in office.  Recommend keep blood pressure log.  Monitor to see if there is any correlation with recent alcohol intake.  Continue to monitor at this time.       3. Hypercholesteremia   Plan to complete lab work prior to follow-up next visit.         Return in about 3 months (around 11/30/2023).    This medical record contains text that has been entered with the assistance of computer voice recognition and dictation software.  Therefore, it may contain unintended errors in text, spelling, punctuation, or grammar.                            "

## 2023-11-02 ENCOUNTER — HOSPITAL ENCOUNTER (OUTPATIENT)
Dept: RADIOLOGY | Facility: MEDICAL CENTER | Age: 68
End: 2023-11-02
Attending: INTERNAL MEDICINE
Payer: MEDICARE

## 2023-11-02 DIAGNOSIS — R91.8 MASS OF RIGHT LUNG: ICD-10-CM

## 2023-11-02 PROCEDURE — 71250 CT THORAX DX C-: CPT

## 2023-12-07 ENCOUNTER — HOSPITAL ENCOUNTER (OUTPATIENT)
Dept: LAB | Facility: MEDICAL CENTER | Age: 68
End: 2023-12-07
Attending: FAMILY MEDICINE
Payer: MEDICARE

## 2023-12-07 ENCOUNTER — HOSPITAL ENCOUNTER (OUTPATIENT)
Dept: LAB | Facility: MEDICAL CENTER | Age: 68
End: 2023-12-07
Attending: INTERNAL MEDICINE
Payer: MEDICARE

## 2023-12-07 DIAGNOSIS — Z13.1 SCREENING FOR DIABETES MELLITUS: ICD-10-CM

## 2023-12-07 DIAGNOSIS — E55.9 VITAMIN D INSUFFICIENCY: ICD-10-CM

## 2023-12-07 DIAGNOSIS — E78.00 HYPERCHOLESTEREMIA: ICD-10-CM

## 2023-12-07 DIAGNOSIS — D72.819 LEUKOPENIA, UNSPECIFIED TYPE: ICD-10-CM

## 2023-12-07 DIAGNOSIS — R91.8 ABNORMAL CT SCAN OF LUNG: ICD-10-CM

## 2023-12-07 LAB
25(OH)D3 SERPL-MCNC: 46 NG/ML (ref 30–100)
ALBUMIN SERPL BCP-MCNC: 4.7 G/DL (ref 3.2–4.9)
ALBUMIN/GLOB SERPL: 1.6 G/DL
ALP SERPL-CCNC: 65 U/L (ref 30–99)
ALT SERPL-CCNC: 11 U/L (ref 2–50)
ANION GAP SERPL CALC-SCNC: 10 MMOL/L (ref 7–16)
AST SERPL-CCNC: 17 U/L (ref 12–45)
BASOPHILS # BLD AUTO: 0.4 % (ref 0–1.8)
BASOPHILS # BLD: 0.02 K/UL (ref 0–0.12)
BILIRUB SERPL-MCNC: 0.4 MG/DL (ref 0.1–1.5)
BUN SERPL-MCNC: 13 MG/DL (ref 8–22)
CALCIUM ALBUM COR SERPL-MCNC: 9.3 MG/DL (ref 8.5–10.5)
CALCIUM SERPL-MCNC: 9.9 MG/DL (ref 8.5–10.5)
CHLORIDE SERPL-SCNC: 103 MMOL/L (ref 96–112)
CHOLEST SERPL-MCNC: 183 MG/DL (ref 100–199)
CO2 SERPL-SCNC: 28 MMOL/L (ref 20–33)
CREAT SERPL-MCNC: 0.91 MG/DL (ref 0.5–1.4)
EOSINOPHIL # BLD AUTO: 0.07 K/UL (ref 0–0.51)
EOSINOPHIL NFR BLD: 1.4 % (ref 0–6.9)
ERYTHROCYTE [DISTWIDTH] IN BLOOD BY AUTOMATED COUNT: 47.1 FL (ref 35.9–50)
EST. AVERAGE GLUCOSE BLD GHB EST-MCNC: 111 MG/DL
GFR SERPLBLD CREATININE-BSD FMLA CKD-EPI: 69 ML/MIN/1.73 M 2
GLOBULIN SER CALC-MCNC: 3 G/DL (ref 1.9–3.5)
GLUCOSE SERPL-MCNC: 103 MG/DL (ref 65–99)
HBA1C MFR BLD: 5.5 % (ref 4–5.6)
HCT VFR BLD AUTO: 41.3 % (ref 37–47)
HDLC SERPL-MCNC: 77 MG/DL
HGB BLD-MCNC: 13.8 G/DL (ref 12–16)
IMM GRANULOCYTES # BLD AUTO: 0.01 K/UL (ref 0–0.11)
IMM GRANULOCYTES NFR BLD AUTO: 0.2 % (ref 0–0.9)
LDLC SERPL CALC-MCNC: 93 MG/DL
LYMPHOCYTES # BLD AUTO: 2.55 K/UL (ref 1–4.8)
LYMPHOCYTES NFR BLD: 51.2 % (ref 22–41)
MCH RBC QN AUTO: 30.3 PG (ref 27–33)
MCHC RBC AUTO-ENTMCNC: 33.4 G/DL (ref 32.2–35.5)
MCV RBC AUTO: 90.8 FL (ref 81.4–97.8)
MONOCYTES # BLD AUTO: 0.33 K/UL (ref 0–0.85)
MONOCYTES NFR BLD AUTO: 6.6 % (ref 0–13.4)
NEUTROPHILS # BLD AUTO: 2 K/UL (ref 1.82–7.42)
NEUTROPHILS NFR BLD: 40.2 % (ref 44–72)
NRBC # BLD AUTO: 0 K/UL
NRBC BLD-RTO: 0 /100 WBC (ref 0–0.2)
PLATELET # BLD AUTO: 274 K/UL (ref 164–446)
PMV BLD AUTO: 10.3 FL (ref 9–12.9)
POTASSIUM SERPL-SCNC: 4.4 MMOL/L (ref 3.6–5.5)
PROT SERPL-MCNC: 7.7 G/DL (ref 6–8.2)
RBC # BLD AUTO: 4.55 M/UL (ref 4.2–5.4)
SODIUM SERPL-SCNC: 141 MMOL/L (ref 135–145)
TRIGL SERPL-MCNC: 67 MG/DL (ref 0–149)
TSH SERPL DL<=0.005 MIU/L-ACNC: 3.92 UIU/ML (ref 0.38–5.33)
WBC # BLD AUTO: 5 K/UL (ref 4.8–10.8)

## 2023-12-07 PROCEDURE — 84443 ASSAY THYROID STIM HORMONE: CPT

## 2023-12-07 PROCEDURE — 83036 HEMOGLOBIN GLYCOSYLATED A1C: CPT

## 2023-12-07 PROCEDURE — 86235 NUCLEAR ANTIGEN ANTIBODY: CPT

## 2023-12-07 PROCEDURE — 36415 COLL VENOUS BLD VENIPUNCTURE: CPT

## 2023-12-07 PROCEDURE — 85025 COMPLETE CBC W/AUTO DIFF WBC: CPT

## 2023-12-07 PROCEDURE — 82306 VITAMIN D 25 HYDROXY: CPT

## 2023-12-07 PROCEDURE — 83516 IMMUNOASSAY NONANTIBODY: CPT | Mod: 91

## 2023-12-07 PROCEDURE — 80053 COMPREHEN METABOLIC PANEL: CPT

## 2023-12-07 PROCEDURE — 80061 LIPID PANEL: CPT

## 2023-12-09 LAB
CENTROMERE IGG TITR SER IF: 1 AU/ML (ref 0–40)
ENA JO1 AB TITR SER: 1 AU/ML (ref 0–40)
ENA SCL70 IGG SER QL: 0 AU/ML (ref 0–40)
ENA SM IGG SER-ACNC: 0 AU/ML (ref 0–40)
ENA SS-B IGG SER IA-ACNC: 2 AU/ML (ref 0–40)
RIBOSOMAL P AB SER-ACNC: 4 AU/ML (ref 0–40)
SSA52 R0ENA AB IGG Q0420: 7 AU/ML (ref 0–40)
SSA60 R0ENA AB IGG Q0419: 2 AU/ML (ref 0–40)
U1 SNRNP IGG SER QL: 3 UNITS (ref 0–19)

## 2023-12-11 ENCOUNTER — OFFICE VISIT (OUTPATIENT)
Dept: SLEEP MEDICINE | Facility: MEDICAL CENTER | Age: 68
End: 2023-12-11
Attending: FAMILY MEDICINE
Payer: MEDICARE

## 2023-12-11 VITALS
HEIGHT: 61 IN | HEART RATE: 68 BPM | OXYGEN SATURATION: 97 % | DIASTOLIC BLOOD PRESSURE: 78 MMHG | SYSTOLIC BLOOD PRESSURE: 116 MMHG | BODY MASS INDEX: 27.38 KG/M2 | WEIGHT: 145 LBS

## 2023-12-11 DIAGNOSIS — R91.8 MASS OF RIGHT LUNG: ICD-10-CM

## 2023-12-11 DIAGNOSIS — J47.9 BRONCHIECTASIS WITHOUT COMPLICATION (HCC): ICD-10-CM

## 2023-12-11 PROCEDURE — 99213 OFFICE O/P EST LOW 20 MIN: CPT | Performed by: INTERNAL MEDICINE

## 2023-12-11 PROCEDURE — 3078F DIAST BP <80 MM HG: CPT | Performed by: INTERNAL MEDICINE

## 2023-12-11 PROCEDURE — 3074F SYST BP LT 130 MM HG: CPT | Performed by: INTERNAL MEDICINE

## 2023-12-11 PROCEDURE — 99211 OFF/OP EST MAY X REQ PHY/QHP: CPT | Performed by: FAMILY MEDICINE

## 2023-12-11 ASSESSMENT — ENCOUNTER SYMPTOMS
FEVER: 0
WHEEZING: 0
DIZZINESS: 0
PALPITATIONS: 0
HEARTBURN: 0
SHORTNESS OF BREATH: 0
CHILLS: 0
COUGH: 0
HEADACHES: 0

## 2023-12-11 ASSESSMENT — FIBROSIS 4 INDEX: FIB4 SCORE: 1.23

## 2023-12-11 NOTE — ASSESSMENT & PLAN NOTE
Benign by biopsy.  Completed 2 years of CT surveillance.   Plan:  - Patient is ok with stopping surveillance   
RML likely post infectious.  No symptoms  Plan:  - Routine vaccinations.  - Monitor symptoms  - Follow up PRN  
Name band;

## 2023-12-11 NOTE — PROGRESS NOTES
"Pulmonary Clinic- Follow up    Date of Service: 12/11/23    Referring Physician: Marry Carrion A.P.N.    Reason for Consult: Lung nodule    Chief Complaint:   Chief Complaint   Patient presents with    Pulmonary Nodule     Last seen 05/26/23    Results     CT-Chest 11/02/23, Lab 12/07/23       HPI:   Cindi Cooper is a 68 y.o. female who is followed by Haley Jin and is referred to the pulmonary clinic for pulmonary nodule/mass, last seen by Dr. Parsons 5/26/23.  Patient is an otherwise healthy woman who presented with shortness of breath in October of 2021 thinking she was having a heart attack.  She was found to have an incidental pulmonary nodule at that time in the RLL near the diaphragm.  This was biopsied via CT guided biopsy 11/30/21 and was necrotic with calcifications and \"focally lined by mesothelium representing pleural surface\".  This has been followed with CT's until June of 2022 when it was noted to be shrinking.    Patient denies pulmonary history.  There is a family history of breast CA.  She is a non-smoker. She does have occasional PND with cough and allergies but no significant symptoms.      CT from June demonstrates mild focal bronchiectasis in the RML with some associated nodules that are smaller than prior as well as a large lobulated mass/nodule on the pleura at the right diaphragm.  There appears to be a few dilated vessels around and possibly entering the mass although it has been biopsied without significant bleeding which would go against AVM.  The mesothelial lining raises the possibility of a congenital abnormality.     12/11/23 - Cindi is without pulmonary symptoms.  Repeat CT scan is negative for any changes to the RLL mass or RML bronchiectasis.  She has completed 2 years of surveillance.  Path from [place] was also negative for malignancy although inconclusive regarding the etiology of the mass.  CTD panel was negative.     Past Medical History:   Diagnosis Date    " Complete tear of right rotator cuff 10/05/2017    High cholesterol     Hyperlipidemia     Pain     typical aches and pains with age    Snoring        Past Surgical History:   Procedure Laterality Date    SHOULDER DECOMPRESSION ARTHROSCOPIC Right 10/5/2017    Procedure: SHOULDER DECOMPRESSION ARTHROSCOPIC SUBACROMIAL;  Surgeon: Mimi Wood M.D.;  Location: SURGERY [facility];  Service: Orthopedics    DEBRIDEMENT, LABRUM, HIP, ARTHROSCOPIC Right 10/5/2017    Procedure: ARTHROSCOPIC LABRAL DEBRIDEMENT;  Surgeon: Mimi Wood M.D.;  Location: SURGERY [facility];  Service: Orthopedics    SHOULDER ARTHROSCOPY W/ ROTATOR CUFF REPAIR Right 10/5/2017    Procedure: SHOULDER ARTHROSCOPY W/ ROTATOR CUFF REPAIR;  Surgeon: Mimi Wood M.D.;  Location: SURGERY [facility];  Service: Orthopedics    ID BREAST AUGMENTATION WITH IMPLANT    1998    OTHER      liposuction       Social History     Socioeconomic History    Marital status:      Spouse name: Not on file    Number of children: Not on file    Years of education: Not on file    Highest education level: Not on file   Occupational History    Occupation:    Tobacco Use    Smoking status: Never     Passive exposure: Never    Smokeless tobacco: Never    Tobacco comments:     on and off as teen   Vaping Use    Vaping Use: Never used   Substance and Sexual Activity    Alcohol use: Yes     Alcohol/week: 1.8 oz     Types: 3 Standard drinks or equivalent per week     Comment: 3 drinks per week    Drug use: Not Currently    Sexual activity: Not Currently     Partners: Male   Other Topics Concern    Not on file   Social History Narrative    , 2 children.     Retired mortgage business.     Social Determinants of Health     Financial Resource Strain: Not on file   Food Insecurity: Not on file   Transportation Needs: Not on file   Physical Activity: Sufficiently Active (10/1/2020)    Exercise Vital Sign     Days of Exercise per  Week: 7 days     Minutes of Exercise per Session: 50 min   Stress: No Stress Concern Present (10/1/2020)    [place] [employer] - Occupational Stress Questionnaire     Feeling of Stress : Not at all   Social Connections: Not on file   Intimate Partner Violence: Not on file   Housing Stability: Not on file          Family History   Problem Relation Age of Onset    Cancer Mother         BREAST, POST GLORY    Cancer Paternal Grandmother         breast    Diabetes Maternal Grandfather        Current Outpatient Medications on File Prior to Visit   Medication Sig Dispense Refill    atovaquone-proguanil (MALARONE) 250-100 MG per tablet Take one tab by mouth daily. Start 1 to 2 days prior to entering a malaria-endemic area, continue throughout the stay and for 7 days after returning. (Patient not taking: Reported on 12/11/2023) 26 Tablet 0    simvastatin (ZOCOR) 20 MG Tab TAKE 1 TABLET BY MOUTH EVERY DAY. IN THE EVENING 100 Tablet 3    vitamin D3 (CHOLECALCIFEROL) 400 UNIT Tab Take 1,000 Units by mouth every day.      Coenzyme Q10 (COQ10 PO) Take 1 Tablet by mouth every day.      Multiple Vitamin (MULTIVITAMIN ADULT PO) Take  by mouth.      VITAMIN E PO Take 1 Tablet by mouth every day.      Cholecalciferol (VITAMIN D PO) Take 1 Tab by mouth every day.      Ascorbic Acid (VITAMIN C PO) Take 1 Tab by mouth every day.      Cyanocobalamin (VITAMIN B 12 PO) Take 1 Tab by mouth every day.      CALCIUM PO Take 1 Tab by mouth every day.      Glucosamine-Chondroitin (GLUCOSAMINE CHONDR COMPLEX PO) Take 1 Tab by mouth every day.       No current facility-administered medications on file prior to visit.       Allergies: Codeine      ROS:   Review of Systems   Constitutional:  Negative for chills, fever and malaise/fatigue.   HENT:  Negative for congestion.    Respiratory:  Negative for cough, shortness of breath and wheezing.    Cardiovascular:  Negative for chest pain, palpitations and leg swelling.  "  Gastrointestinal:  Negative for heartburn.   Neurological:  Negative for dizziness and headaches.   Endo/Heme/Allergies:  Positive for environmental allergies.       Vitals:  /78 (BP Location: Left arm, Patient Position: Sitting, BP Cuff Size: Adult)   Pulse 68   Ht 1.549 m (5' 1\")   Wt 65.8 kg (145 lb)   SpO2 97%     Physical Exam:  Physical Exam  Constitutional:       Appearance: Normal appearance.   HENT:      Head: Atraumatic.      Mouth/Throat:      Mouth: Mucous membranes are dry.   Eyes:      Extraocular Movements: Extraocular movements intact.   Cardiovascular:      Rate and Rhythm: Normal rate and regular rhythm.      Heart sounds: Normal heart sounds.   Pulmonary:      Effort: No respiratory distress.      Breath sounds: Normal breath sounds. No wheezing or rales.   Abdominal:      Palpations: Abdomen is soft.   Musculoskeletal:         General: No swelling, tenderness or deformity.   Skin:     General: Skin is dry.   Neurological:      General: No focal deficit present.      Mental Status: She is alert and oriented to person, place, and time.           Vaccinations:    Pneumovax: Prevnar 20 2023  Covid: Vaccinated + 11/22  Annual Flu: 11/22    Pertinent Studies:  Laboratory Data:    6MWT:    PFTs as reviewed by me personally show:    Imaging as reviewed by me personally show:   11/2/23:    IMPRESSION:        1. Stable benign right lung nodules and right lower lobe mass.  2. No acute process or change.     CT 6/22:  IMPRESSION:        1. The mass in the right lower lobe is smaller than prior.     2. Unchanged cluster of tiny nodules in the right upper lobe with associated cystic change, likely postinflammatory changes.     3. No new or suspicious pulmonary nodule.    Pertinent Cardiac Studies:  Echo:      Assessment/Plan:    Problem List Items Addressed This Visit       Mass of right lung     Benign by biopsy.  Completed 2 years of CT surveillance.   Plan:  - Patient is ok with stopping " surveillance          Bronchiectasis without complication (HCC)     RML likely post infectious.  No symptoms  Plan:  - Routine vaccinations.  - Monitor symptoms  - Follow up PRN             Return if symptoms worsen or fail to improve.     This note was generated using voice recognition software which has a chance of producing errors of grammar and possibly content.  I have made every reasonable attempt to find and correct any obvious errors, but it should be expected that some may not be found prior to finalization of this note.    Time spent in record review prior to patient arrival, reviewing results, and in face-to-face encounter totaled 20 min, excluding any procedures if performed.      Elizabeth Ryder MD RD  Pulmonary and Critical Care Medicine  [facility]

## 2023-12-13 ENCOUNTER — OFFICE VISIT (OUTPATIENT)
Dept: MEDICAL GROUP | Facility: IMAGING CENTER | Age: 68
End: 2023-12-13
Payer: MEDICARE

## 2023-12-13 VITALS
DIASTOLIC BLOOD PRESSURE: 80 MMHG | SYSTOLIC BLOOD PRESSURE: 128 MMHG | RESPIRATION RATE: 16 BRPM | TEMPERATURE: 97.6 F | HEART RATE: 59 BPM | HEIGHT: 61 IN | WEIGHT: 144.8 LBS | BODY MASS INDEX: 27.34 KG/M2 | OXYGEN SATURATION: 98 %

## 2023-12-13 DIAGNOSIS — M79.672 CHRONIC HEEL PAIN, LEFT: ICD-10-CM

## 2023-12-13 DIAGNOSIS — D72.819 LEUKOPENIA, UNSPECIFIED TYPE: ICD-10-CM

## 2023-12-13 DIAGNOSIS — G89.29 CHRONIC HEEL PAIN, LEFT: ICD-10-CM

## 2023-12-13 DIAGNOSIS — E55.9 VITAMIN D INSUFFICIENCY: ICD-10-CM

## 2023-12-13 DIAGNOSIS — Z82.49 FAMILY HISTORY OF CARDIOVASCULAR DISEASE: ICD-10-CM

## 2023-12-13 DIAGNOSIS — E78.00 HYPERCHOLESTEREMIA: ICD-10-CM

## 2023-12-13 DIAGNOSIS — R03.0 ELEVATED BLOOD PRESSURE READING: ICD-10-CM

## 2023-12-13 DIAGNOSIS — R73.09 ELEVATED GLUCOSE: ICD-10-CM

## 2023-12-13 DIAGNOSIS — M72.2 PLANTAR FASCIITIS, LEFT: ICD-10-CM

## 2023-12-13 PROCEDURE — 3074F SYST BP LT 130 MM HG: CPT | Performed by: FAMILY MEDICINE

## 2023-12-13 PROCEDURE — 3079F DIAST BP 80-89 MM HG: CPT | Performed by: FAMILY MEDICINE

## 2023-12-13 PROCEDURE — 99214 OFFICE O/P EST MOD 30 MIN: CPT | Performed by: FAMILY MEDICINE

## 2023-12-13 PROCEDURE — 1126F AMNT PAIN NOTED NONE PRSNT: CPT | Performed by: FAMILY MEDICINE

## 2023-12-13 ASSESSMENT — PAIN SCALES - GENERAL: PAINLEVEL: NO PAIN

## 2023-12-13 ASSESSMENT — FIBROSIS 4 INDEX: FIB4 SCORE: 1.23

## 2023-12-13 NOTE — PROGRESS NOTES
SUBJECTIVE:    Chief Complaint   Patient presents with    Lab Results     Leukopenia, unspecified type    Hypertension     Elevated readings at home 140's/80's.     Foot Problem     Pt states she has left heel pain that started late last spring.        HPI:     Cindi Cooper is a 68 y.o. female here for follow up of labs.   Glucose 103  BP stable. Varies. Today 130s. Sometimes higher but might be situations. Exercises routinely.   Simvastatin 20 mg daily. Lipids improved. Started metamucil.     Left bottom heel pain- walking, then sitting, then gets up again then will limp from pain. Needs good cushion. First thing morning also with symptoms.   Since last spring has had symptoms.     Brother hx of MI, father triple bypass in his 90s.   Patient did have a prior CT chest which did not show any calcified plaque in the coronary arteries.    ROS:  No recent fevers or chills. No nausea or vomiting. No diarrhea. No chest pains or shortness of breath. No lower extremity edema.    Current Outpatient Medications on File Prior to Visit   Medication Sig Dispense Refill    simvastatin (ZOCOR) 20 MG Tab TAKE 1 TABLET BY MOUTH EVERY DAY. IN THE EVENING 100 Tablet 3    vitamin D3 (CHOLECALCIFEROL) 400 UNIT Tab Take 1,000 Units by mouth every day.      Coenzyme Q10 (COQ10 PO) Take 1 Tablet by mouth every day.      Multiple Vitamin (MULTIVITAMIN ADULT PO) Take  by mouth.      VITAMIN E PO Take 1 Tablet by mouth every day.      Cholecalciferol (VITAMIN D PO) Take 1 Tab by mouth every day.      Ascorbic Acid (VITAMIN C PO) Take 1 Tab by mouth every day.      Cyanocobalamin (VITAMIN B 12 PO) Take 1 Tab by mouth every day.      CALCIUM PO Take 1 Tab by mouth every day.      Glucosamine-Chondroitin (GLUCOSAMINE CHONDR COMPLEX PO) Take 1 Tab by mouth every day.       No current facility-administered medications on file prior to visit.       Allergies   Allergen Reactions    Codeine Nausea       Patient Active Problem List     "Diagnosis Date Noted    Bronchiectasis without complication (HCC) 12/11/2023    Mass of right lung 02/21/2023    BMI 27.0-27.9,adult 03/09/2022    History of colon polyps 11/02/2018    Vitamin D insufficiency 03/30/2016    Hypercholesteremia 07/24/2015       Past Medical History:   Diagnosis Date    Complete tear of right rotator cuff 10/05/2017    High cholesterol     Hyperlipidemia     Pain     typical aches and pains with age    Snoring          OBJECTIVE:   /80 (BP Location: Left arm, Patient Position: Sitting, BP Cuff Size: Adult)   Pulse (!) 59   Temp 36.4 °C (97.6 °F) (Temporal)   Resp 16   Ht 1.549 m (5' 1\")   Wt 65.7 kg (144 lb 12.8 oz)   SpO2 98%   BMI 27.36 kg/m²   General: Well-developed well-nourished female, no acute distress  Neck: supple, no lymphadenopathy- cervical or supraclavicular, no thyromegaly  Cardiovascular: regular rate and rhythm, no murmurs, gallops, rubs  Lungs: clear to auscultation bilaterally, no wheezes, crackles, or rhonchi  Abdomen: +bowel sounds, soft, nontender, nondistended, no rebound, no guarding, no hepatosplenomegaly  Extremities: no cyanosis, clubbing, edema. Plantar left heel area with TTP, adequate movement of ankle.   Skin: Warm and dry  Psych: appropriate mood and affect     Latest Reference Range & Units 12/07/23 07:32 12/07/23 07:35   WBC 4.8 - 10.8 K/uL 5.0    RBC 4.20 - 5.40 M/uL 4.55    Hemoglobin 12.0 - 16.0 g/dL 13.8    Hematocrit 37.0 - 47.0 % 41.3    MCV 81.4 - 97.8 fL 90.8    MCH 27.0 - 33.0 pg 30.3    MCHC 32.2 - 35.5 g/dL 33.4    RDW 35.9 - 50.0 fL 47.1    Platelet Count 164 - 446 K/uL 274    MPV 9.0 - 12.9 fL 10.3    Neutrophils-Polys 44.00 - 72.00 % 40.20 (L)    Neutrophils (Absolute) 1.82 - 7.42 K/uL 2.00    Lymphocytes 22.00 - 41.00 % 51.20 (H)    Lymphs (Absolute) 1.00 - 4.80 K/uL 2.55    Monocytes 0.00 - 13.40 % 6.60    Monos (Absolute) 0.00 - 0.85 K/uL 0.33    Eosinophils 0.00 - 6.90 % 1.40    Eos (Absolute) 0.00 - 0.51 K/uL 0.07  "   Basophils 0.00 - 1.80 % 0.40    Baso (Absolute) 0.00 - 0.12 K/uL 0.02    Immature Granulocytes 0.00 - 0.90 % 0.20    Immature Granulocytes (abs) 0.00 - 0.11 K/uL 0.01    Nucleated RBC 0.00 - 0.20 /100 WBC 0.00    NRBC (Absolute) K/uL 0.00    Sodium 135 - 145 mmol/L 141    Potassium 3.6 - 5.5 mmol/L 4.4    Chloride 96 - 112 mmol/L 103    Co2 20 - 33 mmol/L 28    Anion Gap 7.0 - 16.0  10.0    Glucose 65 - 99 mg/dL 103 (H)    Bun 8 - 22 mg/dL 13    Creatinine 0.50 - 1.40 mg/dL 0.91    GFR (CKD-EPI) >60 mL/min/1.73 m 2 69    Calcium 8.5 - 10.5 mg/dL 9.9    Correct Calcium 8.5 - 10.5 mg/dL 9.3    AST(SGOT) 12 - 45 U/L 17    ALT(SGPT) 2 - 50 U/L 11    Alkaline Phosphatase 30 - 99 U/L 65    Total Bilirubin 0.1 - 1.5 mg/dL 0.4    Albumin 3.2 - 4.9 g/dL 4.7    Total Protein 6.0 - 8.2 g/dL 7.7    Globulin 1.9 - 3.5 g/dL 3.0    A-G Ratio g/dL 1.6    Glycohemoglobin 4.0 - 5.6 % 5.5    Estim. Avg Glu mg/dL 111    Cholesterol,Tot 100 - 199 mg/dL 183    Triglycerides 0 - 149 mg/dL 67    HDL >=40 mg/dL 77    LDL <100 mg/dL 93    25-Hydroxy   Vitamin D 25 30 - 100 ng/mL 46    TSH 0.380 - 5.330 uIU/mL 3.920    Anti-Scl-70 0 - 40 AU/mL  0   Sammie-1 Antibody, IgG 0 - 40 AU/mL  1   Anti-Centromere B Ab 0 - 40 AU/mL  1   Ribosome P Ab, IgG 0 - 40 AU/mL  4   Junior Antibodies 0 - 40 AU/mL  0   SSA 52 (R0)(PILI) Ab, IgG 0 - 40 AU/mL  7   SSA 60 (R0)(PILI) Ab, IgG 0 - 40 AU/mL  2   Sjogren'S Anti-Ss-B 0 - 40 AU/mL  2   Junior/RNP IgG (PILI) 0 - 19 Units  3   (L): Data is abnormally low  (H): Data is abnormally high      ASSESSMENT/PLAN:    68 y.o.female       1. Hypercholesteremia   Recommend heart healthy/low cholesterol/high fiber/low sugar/low processed food diet and routine exercise.   Continue on simvastatin 20 mg daily.  Repeat labs in 6 months. Comp Metabolic Panel    Lipid Profile      2. Family history of cardiovascular disease        3. Elevated blood pressure reading -BP stable today.  Appears occasional elevated blood pressures,  situational.  Keep blood pressure log.  Continue routine exercise and healthy diet.  Continue monitor.       4. Leukopenia, unspecified type -overall stable.  Will continue to monitor at this time. CBC WITH DIFFERENTIAL      5. Elevated glucose -currently on statin therapy.  Repeat labs in 6 months.   Comp Metabolic Panel    HEMOGLOBIN A1C      6. Vitamin D insufficiency -last labs stable.  Will monitor in future.       7. Chronic heel pain, left   Component is due to plantar fasciitis.  Discussed recommendations for routine stretching and consideration of night splint.  May consider seeing podiatry in future as needed.  Recommend supportive footwear.  Will assess x-ray for possible bone spur.  Monitor. DX-FOOT-COMPLETE 3+ LEFT      8. Plantar fasciitis, left-as above.       Follow-up sooner if no improvement in symptoms.    Return in about 6 months (around 6/13/2024).    This medical record contains text that has been entered with the assistance of computer voice recognition and dictation software.  Therefore, it may contain unintended errors in text, spelling, punctuation, or grammar.

## 2023-12-14 ENCOUNTER — HOSPITAL ENCOUNTER (OUTPATIENT)
Dept: RADIOLOGY | Facility: MEDICAL CENTER | Age: 68
End: 2023-12-14
Attending: FAMILY MEDICINE
Payer: MEDICARE

## 2023-12-14 ENCOUNTER — RESEARCH ENCOUNTER (OUTPATIENT)
Dept: RESEARCH | Facility: MEDICAL CENTER | Age: 68
End: 2023-12-14

## 2023-12-14 DIAGNOSIS — M79.672 CHRONIC HEEL PAIN, LEFT: ICD-10-CM

## 2023-12-14 DIAGNOSIS — G89.29 CHRONIC HEEL PAIN, LEFT: ICD-10-CM

## 2023-12-14 PROCEDURE — 73630 X-RAY EXAM OF FOOT: CPT | Mod: LT

## 2023-12-20 NOTE — RESEARCH NOTE
Spoke to pt, she states she is not available to participate anytime soon. She will follow up when she is ready.

## 2024-02-07 ENCOUNTER — OFFICE VISIT (OUTPATIENT)
Dept: MEDICAL GROUP | Facility: IMAGING CENTER | Age: 69
End: 2024-02-07
Payer: MEDICARE

## 2024-02-07 VITALS
RESPIRATION RATE: 14 BRPM | HEIGHT: 61 IN | HEART RATE: 66 BPM | BODY MASS INDEX: 27.15 KG/M2 | OXYGEN SATURATION: 98 % | WEIGHT: 143.8 LBS | SYSTOLIC BLOOD PRESSURE: 132 MMHG | TEMPERATURE: 97.8 F | DIASTOLIC BLOOD PRESSURE: 90 MMHG

## 2024-02-07 DIAGNOSIS — G89.29 CHRONIC HEEL PAIN, LEFT: ICD-10-CM

## 2024-02-07 DIAGNOSIS — M79.672 CHRONIC HEEL PAIN, LEFT: ICD-10-CM

## 2024-02-07 DIAGNOSIS — M72.2 PLANTAR FASCIITIS, LEFT: ICD-10-CM

## 2024-02-07 PROCEDURE — 1126F AMNT PAIN NOTED NONE PRSNT: CPT | Performed by: FAMILY MEDICINE

## 2024-02-07 PROCEDURE — 3080F DIAST BP >= 90 MM HG: CPT | Performed by: FAMILY MEDICINE

## 2024-02-07 PROCEDURE — 3075F SYST BP GE 130 - 139MM HG: CPT | Performed by: FAMILY MEDICINE

## 2024-02-07 PROCEDURE — 99213 OFFICE O/P EST LOW 20 MIN: CPT | Performed by: FAMILY MEDICINE

## 2024-02-07 ASSESSMENT — FIBROSIS 4 INDEX: FIB4 SCORE: 1.23

## 2024-02-07 ASSESSMENT — PATIENT HEALTH QUESTIONNAIRE - PHQ9: CLINICAL INTERPRETATION OF PHQ2 SCORE: 0

## 2024-02-07 ASSESSMENT — PAIN SCALES - GENERAL: PAINLEVEL: NO PAIN

## 2024-02-08 NOTE — PROGRESS NOTES
SUBJECTIVE:    Chief Complaint   Patient presents with    Results     Xray results        HPI:     Cindi Cooper is a 68 y.o. female here for follow up of left heel pain.   History of bunion for years, not bothersome.   Heel bothers her. First few steps getting out of bed.   Does routine exercise, heel up and down.   Had xray done.   Going back to [place] soon.     ROS:  No recent fevers or chills.  No lower extremity edema.    Current Outpatient Medications on File Prior to Visit   Medication Sig Dispense Refill    simvastatin (ZOCOR) 20 MG Tab TAKE 1 TABLET BY MOUTH EVERY DAY. IN THE EVENING 100 Tablet 3    vitamin D3 (CHOLECALCIFEROL) 400 UNIT Tab Take 1,000 Units by mouth every day.      Coenzyme Q10 (COQ10 PO) Take 1 Tablet by mouth every day.      Multiple Vitamin (MULTIVITAMIN ADULT PO) Take  by mouth.      VITAMIN E PO Take 1 Tablet by mouth every day.      Cholecalciferol (VITAMIN D PO) Take 1 Tab by mouth every day.      Ascorbic Acid (VITAMIN C PO) Take 1 Tab by mouth every day.      Cyanocobalamin (VITAMIN B 12 PO) Take 1 Tab by mouth every day.      CALCIUM PO Take 1 Tab by mouth every day.      Glucosamine-Chondroitin (GLUCOSAMINE CHONDR COMPLEX PO) Take 1 Tab by mouth every day.       No current facility-administered medications on file prior to visit.       Allergies   Allergen Reactions    Codeine Nausea       Patient Active Problem List    Diagnosis Date Noted    Bronchiectasis without complication (HCC) 12/11/2023    Mass of right lung 02/21/2023    BMI 27.0-27.9,adult 03/09/2022    History of colon polyps 11/02/2018    Vitamin D insufficiency 03/30/2016    Hypercholesteremia 07/24/2015       Past Medical History:   Diagnosis Date    Complete tear of right rotator cuff 10/05/2017    High cholesterol     Hyperlipidemia     Pain     typical aches and pains with age    Snoring          OBJECTIVE:   BP (!) 132/90 (BP Location: Left arm, Patient Position: Sitting, BP Cuff Size: Adult)    "Pulse 66   Temp 36.6 °C (97.8 °F) (Temporal)   Resp 14   Ht 1.549 m (5' 1\")   Wt 65.2 kg (143 lb 12.8 oz)   SpO2 98%   BMI 27.17 kg/m²   General: Well-developed well-nourished female, no acute distress  Extremities: no cyanosis, clubbing, edema. Left planta heel surface with TTP of region of calcaneous, possible slightly decreased fat pad.   Skin: Warm and dry  Psych: appropriate mood and affect    Narrative & Impression     12/14/2023 2:05 PM     HISTORY/REASON FOR EXAM:  Chronic atraumatic left heel pain radiating to the mid plantar aspect of the foot.        TECHNIQUE/EXAM DESCRIPTION AND NUMBER OF VIEWS:  3 views of the LEFT foot.     COMPARISON:  None     FINDINGS:     There is no focal soft tissue swelling.     There is no evidence of displaced fracture or dislocation.     There is degenerative joint space narrowing with mild lateral subluxation of the left 1st MTP joint with associated bunion formation and hallux valgus change. There are subchondral cystic changes and marginal spurring. There is mild degenerative change   in the DIP and PIP joints.     There is no radiographic evidence of plantar calcaneal spurring. Tiny posterior mid calcaneal spur noted.     IMPRESSION:     1.  No evidence of plantar calcaneal spur.  2.  Degenerative change with hallux valgus deformity and bunion formation of the 1st left MTP joint.      ASSESSMENT/PLAN:    68 y.o.female     1. Chronic heel pain, left - component due to plantar fasciitis and possible decrease in fad pad of area.        2. Plantar fasciitis, left        Reviewed options of therapy.   Recommend supportive foot wear and routine stretching/conditioning exercises.   Discussed use of night splint, otc.   Consider otc medication as needed.   Recommend supportive foot wear and slightly cushioned pad for heel.   Consider podiatry referral in future as needed, may consider shoe insert and/or injection therapy in future as needed.     Return in about 6 weeks " (around 3/20/2024), or if symptoms worsen or fail to improve.    This medical record contains text that has been entered with the assistance of computer voice recognition and dictation software.  Therefore, it may contain unintended errors in text, spelling, punctuation, or grammar.

## 2024-02-21 ENCOUNTER — PATIENT OUTREACH (OUTPATIENT)
Dept: ONCOLOGY | Facility: MEDICAL CENTER | Age: 69
End: 2024-02-21
Payer: MEDICARE

## 2024-02-21 NOTE — PROGRESS NOTES
Chart review    CT scan 11/2/23    IMPRESSION:        1. Stable benign right lung nodules and right lower lobe mass.  2. No acute process or change.    Lung navigator will sign off

## 2024-03-07 ENCOUNTER — DOCUMENTATION (OUTPATIENT)
Dept: HEALTH INFORMATION MANAGEMENT | Facility: OTHER | Age: 69
End: 2024-03-07
Payer: MEDICARE

## 2024-06-12 ENCOUNTER — APPOINTMENT (OUTPATIENT)
Dept: MEDICAL GROUP | Facility: IMAGING CENTER | Age: 69
End: 2024-06-12
Payer: MEDICARE

## 2024-08-16 ENCOUNTER — HOSPITAL ENCOUNTER (OUTPATIENT)
Dept: LAB | Facility: MEDICAL CENTER | Age: 69
End: 2024-08-16
Attending: FAMILY MEDICINE
Payer: MEDICARE

## 2024-08-16 DIAGNOSIS — D72.819 LEUKOPENIA, UNSPECIFIED TYPE: ICD-10-CM

## 2024-08-16 DIAGNOSIS — E78.00 HYPERCHOLESTEREMIA: ICD-10-CM

## 2024-08-16 DIAGNOSIS — R73.09 ELEVATED GLUCOSE: ICD-10-CM

## 2024-08-16 DIAGNOSIS — E55.9 VITAMIN D INSUFFICIENCY: ICD-10-CM

## 2024-08-16 LAB
25(OH)D3 SERPL-MCNC: 39 NG/ML (ref 30–100)
ALBUMIN SERPL BCP-MCNC: 4.2 G/DL (ref 3.2–4.9)
ALBUMIN/GLOB SERPL: 1.5 G/DL
ALP SERPL-CCNC: 64 U/L (ref 30–99)
ALT SERPL-CCNC: 16 U/L (ref 2–50)
ANION GAP SERPL CALC-SCNC: 11 MMOL/L (ref 7–16)
AST SERPL-CCNC: 18 U/L (ref 12–45)
BASOPHILS # BLD AUTO: 0.7 % (ref 0–1.8)
BASOPHILS # BLD: 0.03 K/UL (ref 0–0.12)
BILIRUB SERPL-MCNC: 0.4 MG/DL (ref 0.1–1.5)
BUN SERPL-MCNC: 16 MG/DL (ref 8–22)
CALCIUM ALBUM COR SERPL-MCNC: 9.2 MG/DL (ref 8.5–10.5)
CALCIUM SERPL-MCNC: 9.4 MG/DL (ref 8.5–10.5)
CHLORIDE SERPL-SCNC: 106 MMOL/L (ref 96–112)
CHOLEST SERPL-MCNC: 195 MG/DL (ref 100–199)
CO2 SERPL-SCNC: 26 MMOL/L (ref 20–33)
CREAT SERPL-MCNC: 0.83 MG/DL (ref 0.5–1.4)
EOSINOPHIL # BLD AUTO: 0.09 K/UL (ref 0–0.51)
EOSINOPHIL NFR BLD: 2.1 % (ref 0–6.9)
ERYTHROCYTE [DISTWIDTH] IN BLOOD BY AUTOMATED COUNT: 50.1 FL (ref 35.9–50)
EST. AVERAGE GLUCOSE BLD GHB EST-MCNC: 108 MG/DL
GFR SERPLBLD CREATININE-BSD FMLA CKD-EPI: 77 ML/MIN/1.73 M 2
GLOBULIN SER CALC-MCNC: 2.8 G/DL (ref 1.9–3.5)
GLUCOSE SERPL-MCNC: 98 MG/DL (ref 65–99)
HBA1C MFR BLD: 5.4 % (ref 4–5.6)
HCT VFR BLD AUTO: 40.2 % (ref 37–47)
HDLC SERPL-MCNC: 74 MG/DL
HGB BLD-MCNC: 13.5 G/DL (ref 12–16)
IMM GRANULOCYTES # BLD AUTO: 0.01 K/UL (ref 0–0.11)
IMM GRANULOCYTES NFR BLD AUTO: 0.2 % (ref 0–0.9)
LDLC SERPL CALC-MCNC: 107 MG/DL
LYMPHOCYTES # BLD AUTO: 2.24 K/UL (ref 1–4.8)
LYMPHOCYTES NFR BLD: 51.9 % (ref 22–41)
MCH RBC QN AUTO: 30.8 PG (ref 27–33)
MCHC RBC AUTO-ENTMCNC: 33.6 G/DL (ref 32.2–35.5)
MCV RBC AUTO: 91.6 FL (ref 81.4–97.8)
MONOCYTES # BLD AUTO: 0.25 K/UL (ref 0–0.85)
MONOCYTES NFR BLD AUTO: 5.8 % (ref 0–13.4)
NEUTROPHILS # BLD AUTO: 1.7 K/UL (ref 1.82–7.42)
NEUTROPHILS NFR BLD: 39.3 % (ref 44–72)
NRBC # BLD AUTO: 0 K/UL
NRBC BLD-RTO: 0 /100 WBC (ref 0–0.2)
PLATELET # BLD AUTO: 243 K/UL (ref 164–446)
PMV BLD AUTO: 10.4 FL (ref 9–12.9)
POTASSIUM SERPL-SCNC: 5.3 MMOL/L (ref 3.6–5.5)
PROT SERPL-MCNC: 7 G/DL (ref 6–8.2)
RBC # BLD AUTO: 4.39 M/UL (ref 4.2–5.4)
SODIUM SERPL-SCNC: 143 MMOL/L (ref 135–145)
TRIGL SERPL-MCNC: 72 MG/DL (ref 0–149)
TSH SERPL DL<=0.005 MIU/L-ACNC: 3.94 UIU/ML (ref 0.38–5.33)
WBC # BLD AUTO: 4.3 K/UL (ref 4.8–10.8)

## 2024-08-16 PROCEDURE — 80061 LIPID PANEL: CPT

## 2024-08-16 PROCEDURE — 82306 VITAMIN D 25 HYDROXY: CPT

## 2024-08-16 PROCEDURE — 36415 COLL VENOUS BLD VENIPUNCTURE: CPT

## 2024-08-16 PROCEDURE — 84443 ASSAY THYROID STIM HORMONE: CPT

## 2024-08-16 PROCEDURE — 80053 COMPREHEN METABOLIC PANEL: CPT

## 2024-08-16 PROCEDURE — 83036 HEMOGLOBIN GLYCOSYLATED A1C: CPT

## 2024-08-16 PROCEDURE — 85025 COMPLETE CBC W/AUTO DIFF WBC: CPT

## 2024-08-21 ENCOUNTER — APPOINTMENT (OUTPATIENT)
Dept: MEDICAL GROUP | Facility: IMAGING CENTER | Age: 69
End: 2024-08-21
Payer: MEDICARE

## 2024-08-21 ENCOUNTER — HOSPITAL ENCOUNTER (OUTPATIENT)
Dept: RADIOLOGY | Facility: MEDICAL CENTER | Age: 69
End: 2024-08-21
Attending: FAMILY MEDICINE
Payer: MEDICARE

## 2024-08-21 VITALS
OXYGEN SATURATION: 97 % | HEART RATE: 53 BPM | DIASTOLIC BLOOD PRESSURE: 80 MMHG | HEIGHT: 61 IN | WEIGHT: 144.8 LBS | SYSTOLIC BLOOD PRESSURE: 130 MMHG | RESPIRATION RATE: 16 BRPM | BODY MASS INDEX: 27.34 KG/M2 | TEMPERATURE: 97.6 F

## 2024-08-21 DIAGNOSIS — Z12.39 ENCOUNTER FOR SCREENING FOR MALIGNANT NEOPLASM OF BREAST, UNSPECIFIED SCREENING MODALITY: ICD-10-CM

## 2024-08-21 DIAGNOSIS — D72.819 LEUKOPENIA, UNSPECIFIED TYPE: ICD-10-CM

## 2024-08-21 DIAGNOSIS — R42 ORTHOSTATIC LIGHTHEADEDNESS: ICD-10-CM

## 2024-08-21 DIAGNOSIS — R73.09 ELEVATED GLUCOSE: ICD-10-CM

## 2024-08-21 DIAGNOSIS — E78.00 HYPERCHOLESTEREMIA: ICD-10-CM

## 2024-08-21 PROBLEM — J47.9 BRONCHIECTASIS WITHOUT COMPLICATION (HCC): Status: RESOLVED | Noted: 2023-12-11 | Resolved: 2024-08-21

## 2024-08-21 PROCEDURE — 77067 SCR MAMMO BI INCL CAD: CPT

## 2024-08-21 PROCEDURE — 3079F DIAST BP 80-89 MM HG: CPT | Performed by: FAMILY MEDICINE

## 2024-08-21 PROCEDURE — 3075F SYST BP GE 130 - 139MM HG: CPT | Performed by: FAMILY MEDICINE

## 2024-08-21 PROCEDURE — 1126F AMNT PAIN NOTED NONE PRSNT: CPT | Performed by: FAMILY MEDICINE

## 2024-08-21 PROCEDURE — 99214 OFFICE O/P EST MOD 30 MIN: CPT | Performed by: FAMILY MEDICINE

## 2024-08-21 ASSESSMENT — FIBROSIS 4 INDEX: FIB4 SCORE: 1.24

## 2024-08-21 ASSESSMENT — PAIN SCALES - GENERAL: PAINLEVEL: NO PAIN

## 2024-08-21 NOTE — PROGRESS NOTES
SUBJECTIVE:    Chief Complaint   Patient presents with    Follow-Up     On lab results from 08/16/24       HPI:     Cindi Cooper is a 69 y.o. female here for lab review.      from 93.  Simvastatin 20 mg, was taking half tab. Then back to whole tab after labs.   No other diet changes.   Wine on alcohol, wine with dinner, cocktail on weekend. Not daily intake.   A1c stable.    Mildly decreased white blood cell count.  Some aches and pain which she feels is due to age. Slight lymphocytosis since 2023.   No other issues with symptoms of illness.    130s blood pressures usually.  Exercise helps maintain blood pressure.  Occasionally if squatting gets up might feel slightly lightheaded when washing her car.  Does not notice any symptoms with exercise.      ROS:  No recent fevers or chills. No nausea or vomiting. No diarrhea. No chest pains or shortness of breath. No lower extremity edema.  No she is with continued cough symptoms.    Current Outpatient Medications on File Prior to Visit   Medication Sig Dispense Refill    simvastatin (ZOCOR) 20 MG Tab TAKE 1 TABLET BY MOUTH EVERY DAY. IN THE EVENING 100 Tablet 3    vitamin D3 (CHOLECALCIFEROL) 400 UNIT Tab Take 1,000 Units by mouth every day.      Coenzyme Q10 (COQ10 PO) Take 1 Tablet by mouth every day.      Multiple Vitamin (MULTIVITAMIN ADULT PO) Take  by mouth.      VITAMIN E PO Take 1 Tablet by mouth every day.      Cholecalciferol (VITAMIN D PO) Take 1 Tab by mouth every day.      Ascorbic Acid (VITAMIN C PO) Take 1 Tab by mouth every day.      CALCIUM PO Take 1 Tab by mouth every day.      Cyanocobalamin (VITAMIN B 12 PO) Take 1 Tab by mouth every day. (Patient not taking: Reported on 8/21/2024)      Glucosamine-Chondroitin (GLUCOSAMINE CHONDR COMPLEX PO) Take 1 Tab by mouth every day. (Patient not taking: Reported on 8/21/2024)       No current facility-administered medications on file prior to visit.       Allergies   Allergen Reactions     "Codeine Nausea       Patient Active Problem List    Diagnosis Date Noted    Bronchiectasis without complication (HCC) 12/11/2023    Mass of right lung 02/21/2023    BMI 27.0-27.9,adult 03/09/2022    History of colon polyps 11/02/2018    Vitamin D insufficiency 03/30/2016    Hypercholesteremia 07/24/2015       Past Medical History:   Diagnosis Date    Complete tear of right rotator cuff 10/05/2017    High cholesterol     Hyperlipidemia     Pain     typical aches and pains with age    Snoring          OBJECTIVE:   /80 (BP Location: Left arm, Patient Position: Sitting, BP Cuff Size: Adult)   Pulse (!) 53   Temp 36.4 °C (97.6 °F) (Temporal)   Resp 16   Ht 1.549 m (5' 1\")   Wt 65.7 kg (144 lb 12.8 oz)   SpO2 97%   BMI 27.36 kg/m²   General: Well-developed well-nourished female, no acute distress  Neck: supple, no lymphadenopathy- cervical or supraclavicular, no thyromegaly  Cardiovascular: regular rate and rhythm, no murmurs, gallops, rubs  Lungs: clear to auscultation bilaterally, no wheezes, crackles, or rhonchi  Abdomen: +bowel sounds, soft, nontender, nondistended, no rebound, no guarding, no hepatosplenomegaly  Extremities: no cyanosis, clubbing, edema  Skin: Warm and dry  Psych: appropriate mood and affect     Latest Reference Range & Units 08/16/24 08:38   WBC 4.8 - 10.8 K/uL 4.3 (L)   RBC 4.20 - 5.40 M/uL 4.39   Hemoglobin 12.0 - 16.0 g/dL 13.5   Hematocrit 37.0 - 47.0 % 40.2   MCV 81.4 - 97.8 fL 91.6   MCH 27.0 - 33.0 pg 30.8   MCHC 32.2 - 35.5 g/dL 33.6   RDW 35.9 - 50.0 fL 50.1 (H)   Platelet Count 164 - 446 K/uL 243   MPV 9.0 - 12.9 fL 10.4   Neutrophils-Polys 44.00 - 72.00 % 39.30 (L)   Neutrophils (Absolute) 1.82 - 7.42 K/uL 1.70 (L)   Lymphocytes 22.00 - 41.00 % 51.90 (H)   Lymphs (Absolute) 1.00 - 4.80 K/uL 2.24   Monocytes 0.00 - 13.40 % 5.80   Monos (Absolute) 0.00 - 0.85 K/uL 0.25   Eosinophils 0.00 - 6.90 % 2.10   Eos (Absolute) 0.00 - 0.51 K/uL 0.09   Basophils 0.00 - 1.80 % 0.70   Kario " (Absolute) 0.00 - 0.12 K/uL 0.03   Immature Granulocytes 0.00 - 0.90 % 0.20   Immature Granulocytes (abs) 0.00 - 0.11 K/uL 0.01   Nucleated RBC 0.00 - 0.20 /100 WBC 0.00   NRBC (Absolute) K/uL 0.00   Sodium 135 - 145 mmol/L 143   Potassium 3.6 - 5.5 mmol/L 5.3   Chloride 96 - 112 mmol/L 106   Co2 20 - 33 mmol/L 26   Anion Gap 7.0 - 16.0  11.0   Glucose 65 - 99 mg/dL 98   Bun 8 - 22 mg/dL 16   Creatinine 0.50 - 1.40 mg/dL 0.83   GFR (CKD-EPI) >60 mL/min/1.73 m 2 77   Calcium 8.5 - 10.5 mg/dL 9.4   Correct Calcium 8.5 - 10.5 mg/dL 9.2   AST(SGOT) 12 - 45 U/L 18   ALT(SGPT) 2 - 50 U/L 16   Alkaline Phosphatase 30 - 99 U/L 64   Total Bilirubin 0.1 - 1.5 mg/dL 0.4   Albumin 3.2 - 4.9 g/dL 4.2   Total Protein 6.0 - 8.2 g/dL 7.0   Globulin 1.9 - 3.5 g/dL 2.8   A-G Ratio g/dL 1.5   Glycohemoglobin 4.0 - 5.6 % 5.4   Estim. Avg Glu mg/dL 108   Cholesterol,Tot 100 - 199 mg/dL 195   Triglycerides 0 - 149 mg/dL 72   HDL >=40 mg/dL 74   LDL <100 mg/dL 107 (H)   25-Hydroxy   Vitamin D 25 30 - 100 ng/mL 39   TSH 0.380 - 5.330 uIU/mL 3.940   (L): Data is abnormally low  (H): Data is abnormally high    ASSESSMENT/PLAN:    69 y.o.female       1. Hypercholesteremia -slight increase in LDL with decrease of simvastatin dosage.  Recommend heart healthy/low cholesterol/high fiber/low sugar/low processed food diet and routine exercise.  She will continue on simvastatin 20 mg daily.  Monitor labs. CMP, lipid      2. Leukopenia, unspecified type-mild.  Overall stable compared to prior.  Patient is otherwise asymptomatic.  Reviewed options of further evaluation.  Discussed consider consultation with hematologist.  Patient would like to continue to monitor and repeat labs in 6 months at this time.  Plan to repeat labs in 6 months.  Patient to contact office sooner if any new symptoms occur. CBC with differential      3. Elevated glucose -stable A1c.  Recommend heart healthy/low cholesterol/high fiber/low sugar/low processed food diet and  routine exercise.  Monitor labs in future. CMP, A1c      4. Orthostatic lightheadedness -appears situational if outdoors in heat.  Blood pressure otherwise seems stable.  Modify activity.  Continue to monitor and follow-up as needed if any worsening symptoms.       5. Encounter for screening for malignant neoplasm of breast, unspecified screening modality  MA-SCREENING MAMMO BILAT W/IMPLANTS W/AKHIL W/CAD          Return in about 6 months (around 2/21/2025).    This medical record contains text that has been entered with the assistance of computer voice recognition and dictation software.  Therefore, it may contain unintended errors in text, spelling, punctuation, or grammar.

## 2025-03-05 ENCOUNTER — APPOINTMENT (OUTPATIENT)
Dept: MEDICAL GROUP | Facility: IMAGING CENTER | Age: 70
End: 2025-03-05
Payer: MEDICARE

## 2025-03-17 ENCOUNTER — HOSPITAL ENCOUNTER (OUTPATIENT)
Dept: LAB | Facility: MEDICAL CENTER | Age: 70
End: 2025-03-17
Attending: FAMILY MEDICINE
Payer: MEDICARE

## 2025-03-17 LAB
ALBUMIN SERPL BCP-MCNC: 4.4 G/DL (ref 3.2–4.9)
ALBUMIN/GLOB SERPL: 1.4 G/DL
ALP SERPL-CCNC: 62 U/L (ref 30–99)
ALT SERPL-CCNC: 18 U/L (ref 2–50)
ANION GAP SERPL CALC-SCNC: 10 MMOL/L (ref 7–16)
AST SERPL-CCNC: 26 U/L (ref 12–45)
BILIRUB SERPL-MCNC: 0.5 MG/DL (ref 0.1–1.5)
BUN SERPL-MCNC: 18 MG/DL (ref 8–22)
CALCIUM ALBUM COR SERPL-MCNC: 9.3 MG/DL (ref 8.5–10.5)
CALCIUM SERPL-MCNC: 9.6 MG/DL (ref 8.5–10.5)
CHLORIDE SERPL-SCNC: 104 MMOL/L (ref 96–112)
CO2 SERPL-SCNC: 27 MMOL/L (ref 20–33)
CREAT SERPL-MCNC: 0.9 MG/DL (ref 0.5–1.4)
GFR SERPLBLD CREATININE-BSD FMLA CKD-EPI: 70 ML/MIN/1.73 M 2
GLOBULIN SER CALC-MCNC: 3.2 G/DL (ref 1.9–3.5)
GLUCOSE SERPL-MCNC: 93 MG/DL (ref 65–99)
POTASSIUM SERPL-SCNC: 4.6 MMOL/L (ref 3.6–5.5)
PROT SERPL-MCNC: 7.6 G/DL (ref 6–8.2)
SODIUM SERPL-SCNC: 141 MMOL/L (ref 135–145)

## 2025-03-17 PROCEDURE — 36415 COLL VENOUS BLD VENIPUNCTURE: CPT

## 2025-03-17 PROCEDURE — 80053 COMPREHEN METABOLIC PANEL: CPT

## 2025-03-20 ENCOUNTER — APPOINTMENT (OUTPATIENT)
Dept: MEDICAL GROUP | Facility: IMAGING CENTER | Age: 70
End: 2025-03-20
Payer: MEDICARE

## 2025-03-21 ENCOUNTER — RESULTS FOLLOW-UP (OUTPATIENT)
Dept: MEDICAL GROUP | Facility: IMAGING CENTER | Age: 70
End: 2025-03-21

## 2025-04-23 ENCOUNTER — HOSPITAL ENCOUNTER (OUTPATIENT)
Facility: MEDICAL CENTER | Age: 70
End: 2025-04-23
Attending: FAMILY MEDICINE
Payer: MEDICARE

## 2025-04-23 DIAGNOSIS — E78.00 HYPERCHOLESTEREMIA: ICD-10-CM

## 2025-04-23 LAB
CHOLEST SERPL-MCNC: 188 MG/DL (ref 100–199)
FASTING STATUS PATIENT QL REPORTED: NORMAL
HDLC SERPL-MCNC: 77 MG/DL
LDLC SERPL CALC-MCNC: 92 MG/DL
TRIGL SERPL-MCNC: 95 MG/DL (ref 0–149)

## 2025-04-23 PROCEDURE — 36415 COLL VENOUS BLD VENIPUNCTURE: CPT

## 2025-04-23 PROCEDURE — 80061 LIPID PANEL: CPT

## 2025-05-07 ENCOUNTER — OFFICE VISIT (OUTPATIENT)
Dept: MEDICAL GROUP | Facility: IMAGING CENTER | Age: 70
End: 2025-05-07
Payer: MEDICARE

## 2025-05-07 ENCOUNTER — HOSPITAL ENCOUNTER (OUTPATIENT)
Dept: RADIOLOGY | Facility: MEDICAL CENTER | Age: 70
End: 2025-05-07
Attending: FAMILY MEDICINE
Payer: MEDICARE

## 2025-05-07 VITALS
HEIGHT: 61 IN | OXYGEN SATURATION: 96 % | RESPIRATION RATE: 14 BRPM | HEART RATE: 59 BPM | DIASTOLIC BLOOD PRESSURE: 90 MMHG | SYSTOLIC BLOOD PRESSURE: 160 MMHG | WEIGHT: 146 LBS | TEMPERATURE: 97.5 F | BODY MASS INDEX: 27.56 KG/M2

## 2025-05-07 DIAGNOSIS — M79.644 BILATERAL THUMB PAIN: ICD-10-CM

## 2025-05-07 DIAGNOSIS — Z82.49 FAMILY HISTORY OF CARDIOVASCULAR DISEASE: ICD-10-CM

## 2025-05-07 DIAGNOSIS — E78.00 HYPERCHOLESTEREMIA: ICD-10-CM

## 2025-05-07 DIAGNOSIS — M79.645 BILATERAL THUMB PAIN: ICD-10-CM

## 2025-05-07 DIAGNOSIS — I10 PRIMARY HYPERTENSION: ICD-10-CM

## 2025-05-07 PROCEDURE — 99214 OFFICE O/P EST MOD 30 MIN: CPT | Performed by: FAMILY MEDICINE

## 2025-05-07 PROCEDURE — 3077F SYST BP >= 140 MM HG: CPT | Performed by: FAMILY MEDICINE

## 2025-05-07 PROCEDURE — 77077 JOINT SURVEY SINGLE VIEW: CPT

## 2025-05-07 PROCEDURE — 3080F DIAST BP >= 90 MM HG: CPT | Performed by: FAMILY MEDICINE

## 2025-05-07 RX ORDER — LOSARTAN POTASSIUM 25 MG/1
25 TABLET ORAL DAILY
Qty: 100 TABLET | Refills: 0 | Status: SHIPPED | OUTPATIENT
Start: 2025-05-07 | End: 2026-06-11

## 2025-05-07 ASSESSMENT — FIBROSIS 4 INDEX: FIB4 SCORE: 1.71

## 2025-05-07 ASSESSMENT — PATIENT HEALTH QUESTIONNAIRE - PHQ9: CLINICAL INTERPRETATION OF PHQ2 SCORE: 0

## 2025-05-07 NOTE — PROGRESS NOTES
SUBJECTIVE:    Chief Complaint   Patient presents with    Other     Issues with both thumbs bump and more left side   Pt request cardio check up because two of the bothers has blockage        HPI:     Cindi Cooper is a 70 y.o. female here for bilateral thumb pain and request further evaluation due to family history of cardiovascular disease.    Thumb without bilaterally. L>R- thumb cmc joint prominence.   Cleaning/washing care- discomfort of area. No specific injury to area.   Topical therapy- her 's prescription, uncertain of name.    More so past several months, past year with symptoms.   No numbness/tingling in hands.  Using her thumbs might feel weak.  Holding ipad is annoying.     Family history of cardiovascular disease.  Older brother- Jan 90% blockage.   Other brother 2 years ago had 95% blockage.  Prior chest CT 11/2023- no coronary artery calcification noted.  She walks 4 miles a week without issues, this will wait on stress test.    Has not been checking her blood pressure at home, but blood pressure may tend to be elevated at times.  No other current associated symptoms.      ROS:  No recent fevers or chills. No chest pains or shortness of breath. No lower extremity edema.    Current Outpatient Medications on File Prior to Visit   Medication Sig Dispense Refill    simvastatin (ZOCOR) 20 MG Tab TAKE 1 TABLET BY MOUTH EVERY DAY. IN THE EVENING 100 Tablet 3    vitamin D3 (CHOLECALCIFEROL) 400 UNIT Tab Take 1,000 Units by mouth every day.      Coenzyme Q10 (COQ10 PO) Take 1 Tablet by mouth every day.      Multiple Vitamin (MULTIVITAMIN ADULT PO) Take  by mouth.      VITAMIN E PO Take 1 Tablet by mouth every day.      Cholecalciferol (VITAMIN D PO) Take 1 Tab by mouth every day.      Ascorbic Acid (VITAMIN C PO) Take 1 Tab by mouth every day.      CALCIUM PO Take 1 Tab by mouth every day.      Cyanocobalamin (VITAMIN B 12 PO) Take 1 Tab by mouth every day. (Patient not taking: Reported on  "8/21/2024)      Glucosamine-Chondroitin (GLUCOSAMINE CHONDR COMPLEX PO) Take 1 Tab by mouth every day. (Patient not taking: Reported on 8/21/2024)       No current facility-administered medications on file prior to visit.       Allergies   Allergen Reactions    Codeine Nausea       Patient Active Problem List    Diagnosis Date Noted    Mass of right lung 02/21/2023    BMI 27.0-27.9,adult 03/09/2022    History of colon polyps 11/02/2018    Vitamin D insufficiency 03/30/2016    Hypercholesteremia 07/24/2015       Past Medical History:   Diagnosis Date    Bronchiectasis without complication (HCC) 12/11/2023    Complete tear of right rotator cuff 10/05/2017    High cholesterol     Hyperlipidemia     Pain     typical aches and pains with age    Snoring        OBJECTIVE:   BP (!) 160/90 (BP Location: Left arm, Patient Position: Sitting, BP Cuff Size: Adult)   Pulse (!) 59   Temp 36.4 °C (97.5 °F) (Temporal)   Resp 14   Ht 1.549 m (5' 1\")   Wt 66.2 kg (146 lb)   SpO2 96%   BMI 27.59 kg/m²   General: Well-developed well-nourished female, no acute distress  Neck: supple, no lymphadenopathy- cervical or supraclavicular, no thyromegaly  Cardiovascular: regular rate and rhythm, no murmurs, gallops, rubs  Lungs: clear to auscultation bilaterally, no wheezes, crackles, or rhonchi  Abdomen: +bowel sounds, soft, nontender, nondistended, no rebound, no guarding, no hepatosplenomegaly  Extremities: no cyanosis, clubbing, edema.  Thumbs with adequate range of motion and strength, tenderness to palpation bilateral first CMC joint, slight prominence of first CMC joint left greater than right, positive grind test bilateral thumbs.  No erythema/edema/ecchymosis.  Skin: Warm and dry  Psych: appropriate mood and affect     Latest Reference Range & Units 03/17/25 13:45 04/23/25 10:19   Sodium 135 - 145 mmol/L 141    Potassium 3.6 - 5.5 mmol/L 4.6    Chloride 96 - 112 mmol/L 104    Co2 20 - 33 mmol/L 27    Anion Gap 7.0 - 16.0  10.0  "   Glucose 65 - 99 mg/dL 93    Bun 8 - 22 mg/dL 18    Creatinine 0.50 - 1.40 mg/dL 0.90    GFR (CKD-EPI) >60 mL/min/1.73 m 2 70    Calcium 8.5 - 10.5 mg/dL 9.6    Correct Calcium 8.5 - 10.5 mg/dL 9.3    AST(SGOT) 12 - 45 U/L 26    ALT(SGPT) 2 - 50 U/L 18    Alkaline Phosphatase 30 - 99 U/L 62    Total Bilirubin 0.1 - 1.5 mg/dL 0.5    Albumin 3.2 - 4.9 g/dL 4.4    Total Protein 6.0 - 8.2 g/dL 7.6    Globulin 1.9 - 3.5 g/dL 3.2    A-G Ratio g/dL 1.4    Fasting Status   Fasting   Cholesterol,Tot 100 - 199 mg/dL  188   Triglycerides 0 - 149 mg/dL  95   HDL >=40 mg/dL  77   LDL <100 mg/dL  92         ASSESSMENT/PLAN:    70 y.o.female       1. Hypercholesteremia   She will continue on simvastatin 20 mg daily.  Discussed consideration of increasing to 40 mg daily in future.  Continue healthy diet and routine exercise as tolerated. CT-CARDIAC SCORING      2. Family history of cardiovascular disease   Reviewed considerations for further evaluation.  No issues with regular walking activities, thus will wait on stress test at this time.  Patient would like to proceed with CT cardiac scoring. CT-CARDIAC SCORING    Referral to Genetic Research Studies      3. Primary hypertension   Recommend keep blood pressure log.    Will prescribe losartan 25 mg daily to start. Reviewed precautions and potential side effects of medication therapy.  losartan (COZAAR) 25 MG Tab      4. Bilateral thumb pain   Recommend use of thumb spica splint with repetitive activities.  Reviewed recommendations for modification of activities to avoid aggravating factors.  Limit use of Voltaren gel as needed.  Reviewed conditioning exercises. diclofenac sodium (VOLTAREN) 1 % Gel    DX-JOINT SURVEY-HANDS SINGLE VIEW        Follow up in 6-8 weeks.     This medical record contains text that has been entered with the assistance of computer voice recognition and dictation software.  Therefore, it may contain unintended errors in text, spelling, punctuation, or  grammar.

## 2025-05-15 ENCOUNTER — HOSPITAL ENCOUNTER (OUTPATIENT)
Dept: RADIOLOGY | Facility: MEDICAL CENTER | Age: 70
End: 2025-05-15
Attending: FAMILY MEDICINE
Payer: COMMERCIAL

## 2025-05-15 DIAGNOSIS — E78.00 HYPERCHOLESTEREMIA: ICD-10-CM

## 2025-05-15 DIAGNOSIS — Z82.49 FAMILY HISTORY OF CARDIOVASCULAR DISEASE: ICD-10-CM

## 2025-05-15 PROCEDURE — 4410556 CT-CARDIAC SCORING (SELF PAY ONLY)

## 2025-06-04 ENCOUNTER — APPOINTMENT (OUTPATIENT)
Dept: MEDICAL GROUP | Facility: IMAGING CENTER | Age: 70
End: 2025-06-04
Payer: MEDICARE

## 2025-06-25 ENCOUNTER — OFFICE VISIT (OUTPATIENT)
Dept: MEDICAL GROUP | Facility: IMAGING CENTER | Age: 70
End: 2025-06-25
Payer: MEDICARE

## 2025-06-25 VITALS
TEMPERATURE: 97.8 F | RESPIRATION RATE: 16 BRPM | WEIGHT: 147 LBS | BODY MASS INDEX: 27.75 KG/M2 | HEART RATE: 68 BPM | SYSTOLIC BLOOD PRESSURE: 120 MMHG | DIASTOLIC BLOOD PRESSURE: 62 MMHG | OXYGEN SATURATION: 97 % | HEIGHT: 61 IN

## 2025-06-25 DIAGNOSIS — M79.645 BILATERAL THUMB PAIN: ICD-10-CM

## 2025-06-25 DIAGNOSIS — M79.644 BILATERAL THUMB PAIN: ICD-10-CM

## 2025-06-25 DIAGNOSIS — E55.9 VITAMIN D INSUFFICIENCY: ICD-10-CM

## 2025-06-25 DIAGNOSIS — E78.00 HYPERCHOLESTEREMIA: ICD-10-CM

## 2025-06-25 DIAGNOSIS — I10 PRIMARY HYPERTENSION: Primary | ICD-10-CM

## 2025-06-25 DIAGNOSIS — R93.1 ELEVATED CORONARY ARTERY CALCIUM SCORE: ICD-10-CM

## 2025-06-25 DIAGNOSIS — D72.819 LEUKOPENIA, UNSPECIFIED TYPE: ICD-10-CM

## 2025-06-25 DIAGNOSIS — R93.89 ABNORMAL CT OF THE CHEST: ICD-10-CM

## 2025-06-25 DIAGNOSIS — R73.09 ELEVATED GLUCOSE: ICD-10-CM

## 2025-06-25 DIAGNOSIS — R91.8 MASS OF RIGHT LUNG: ICD-10-CM

## 2025-06-25 PROCEDURE — 3074F SYST BP LT 130 MM HG: CPT | Performed by: FAMILY MEDICINE

## 2025-06-25 PROCEDURE — 99214 OFFICE O/P EST MOD 30 MIN: CPT | Performed by: FAMILY MEDICINE

## 2025-06-25 PROCEDURE — 3078F DIAST BP <80 MM HG: CPT | Performed by: FAMILY MEDICINE

## 2025-06-25 ASSESSMENT — FIBROSIS 4 INDEX: FIB4 SCORE: 1.71

## 2025-06-25 NOTE — PROGRESS NOTES
"  SUBJECTIVE:    Chief Complaint   Patient presents with    Lab Results     Imaging hands -5/8       HPI:     Cindi Cooper is a 70 y.o. female with bilateral thumb pain.  She did have an x-ray completed and also completed CT cardiac score.    Bilateral thumb pain.  Hand xray unremarkable for symptoms.  Topical therapy-Voltaren gel.  Connective tissues panel-completed in the past which was unremarkable.  Notes her prior foot issues improved.    CT cardiac score slightly elevated.  She did have finding of the lung which was further reviewed by radiology and pulmonary who noted stability.  No further imaging was recommended at this time.  Otherwise asymptomatic.    Hypertension-recently returned from [place] and this was not taking blood pressure medication regularly.  Otherwise has been prescribed losartan 25 mg daily.  She may consider taking it in the evening.    Hypercholesterolemia-increased to simvastatin 40 mg daily.     ROS:  No recent fevers or chills. No nausea or vomiting. No diarrhea. No chest pains or shortness of breath. No lower extremity edema.  No other cough symptoms.    Medications Ordered Prior to Encounter[1]    Allergies[2]    Patient Active Problem List    Diagnosis Date Noted    Mass of right lung 02/21/2023    BMI 27.0-27.9,adult 03/09/2022    History of colon polyps 11/02/2018    Vitamin D insufficiency 03/30/2016    Hypercholesteremia 07/24/2015       Past Medical History[3]      OBJECTIVE:   /62 (BP Location: Left arm, Patient Position: Sitting, BP Cuff Size: Adult)   Pulse 68   Temp 36.6 °C (97.8 °F) (Temporal)   Resp 16   Ht 1.549 m (5' 1\")   Wt 66.7 kg (147 lb) Comment: pt report  SpO2 97%   BMI 27.78 kg/m²   General: Well-developed well-nourished female, no acute distress  Neck: supple, no lymphadenopathy- cervical or supraclavicular, no thyromegaly  Cardiovascular: regular rate and rhythm, no murmurs, gallops, rubs  Lungs: clear to auscultation bilaterally, no " wheezes, crackles, or rhonchi  Abdomen: +bowel sounds, soft, nontender, nondistended, no rebound, no guarding, no hepatosplenomegaly  Extremities: no cyanosis, clubbing, edema  Skin: Warm and dry  Psych: appropriate mood and affect    Narrative & Impression     5/7/2025 11:31 AM     HISTORY/REASON FOR EXAM:  Bilateral carpometacarpal pain.     TECHNIQUE/EXAM DESCRIPTION AND NUMBER OF VIEWS:  Joint survey, single view of the hands.     COMPARISON: None     FINDINGS:     Normal bone mineralization.     Right hand:  No erosions.  No fractures identified.  No significant osteoarthritis.     Left hand:  No erosions.  No fractures identified.  No significant osteoarthritis.  Small soft tissue mineralization proximal to the first metacarpal base, likely of no clinical significance.     IMPRESSION:     1.  No erosions to suggest inflammatory or crystal-induced arthropathy.  2.  No significant osteoarthritis.           Exam Ended: 05/07/25 11:38 AM Last Resulted: 05/08/25  7:41 AM     Addenda  Correction: The large bilobed right lung base mass is compared to multiple CT scans dating back to 10/27/2021. It is actually roughly similar in size, especially the central spiculated component, indicating no significant change on current study. I   discussed the findings with Dr. Elizabeth Ryder at transcription time. No special follow-up is needed.  Signed by Josue Conteh M.D. on 5/24/2025  4:33 PM  Narrative & Impression     5/15/2025 4:48 PM     HISTORY/REASON FOR EXAM: Screening.     COMPARISON: 11/2/2023 CT     The study is performed on an asymptomatic, low risk patient for initial detection and risk assessment.     TECHNIQUE/EXAM DESCRIPTION: CT Calcium Score.     Multi-detector, helical imaging of the heart was performed with ECG gating and suspended respiration. Postprocessing was performed on a three-dimensional computer workstation. Diastolic phase images were evaluated for coronary artery calcification, and a    quantitative assessment provided.     Low dose optimization technique was utilized for this CT exam including automated exposure control and adjustment of the mA and/or kV according to patient size.     FINDINGS:     Ancillary findings:  Cardiovascular: Normal size.  Lungs: 5.5 x 3 cm bilobed right lung base indeterminate mass with mild adjacent atelectasis. New since 11/2/2023. CT chest with contrast is recommended..  Mediastinum: Normal.  Upper abdomen: Normal.     Coronary calcification:  LMA - 0.0  LCX - 0.0  LAD - 106.8  RCA - 27.7     Total Calcium Score: 134.5     Percentile: Calcium score is below the 75th percentile for the patient's age and sex.        IMPRESSION:        1. 5.5 cm bilobed right lung mass new since 11/2/2023. CT chest with contrast is recommended.     2. Calcium Score of 100-399 AND <75th percentile:    ASSESSMENT/PLAN:    70 y.o.female       1. Bilateral thumb pain -stable.  X-ray reviewed.  Reviewed further lab work as needed.  Modify activities.  Recommend routine stretching conditioning exercises.  Recommend anti-inflammatory diet.  May use topical therapy as needed.  Continue to monitor at this time. ARIAS REFLEXIVE PROFILE    RHEUMATOID ARTHRITIS FACTOR    CCP    CRP QUANTITIVE (NON-CARDIAC)    Sed Rate      2. Hypercholesteremia-has increase his simvastatin 40 mg daily.  Continue low-cholesterol, high-fiber diet and routine exercise as tolerated.  Plan to repeat labs in 3 to 4 months. Lipid Profile    Comp Metabolic Panel    TSH WITH REFLEX TO FT4      3. Elevated coronary artery calcium score   Continue adequate blood pressure and lipid control.  May consider low-dose aspirin therapy in future.  Monitor.       4. Abnormal CT of the chest-see #5.       5. Mass of right lung-reviewed with pulmonary who notes stability, no further imaging recommended at this time.       6. Leukopenia, unspecified type   Monitor. CBC WITH DIFFERENTIAL      7. Elevated glucose   Monitor. HEMOGLOBIN A1C       8. Vitamin D insufficiency   Monitor. VITAMIN D,25 HYDROXY (DEFICIENCY)      9.      Primary hypertension-stable.  Continue on losartan 25 mg daily, consider taking in evening.        Return in about 3 months (around 9/25/2025).    This medical record contains text that has been entered with the assistance of computer voice recognition and dictation software.  Therefore, it may contain unintended errors in text, spelling, punctuation, or grammar.                                 [1]   Current Outpatient Medications on File Prior to Visit   Medication Sig Dispense Refill    simvastatin (ZOCOR) 40 MG Tab Take 1 Tablet by mouth every evening. 100 Tablet 3    diclofenac sodium (VOLTAREN) 1 % Gel Apply 2 g topically 3 times a day as needed (thumb pain). 150 g 2    losartan (COZAAR) 25 MG Tab Take 1 Tablet by mouth every day. 100 Tablet 0    vitamin D3 (CHOLECALCIFEROL) 400 UNIT Tab Take 1,000 Units by mouth every day.      Coenzyme Q10 (COQ10 PO) Take 1 Tablet by mouth every day.      Multiple Vitamin (MULTIVITAMIN ADULT PO) Take  by mouth.      VITAMIN E PO Take 1 Tablet by mouth every day.      Cholecalciferol (VITAMIN D PO) Take 1 Tab by mouth every day.      Ascorbic Acid (VITAMIN C PO) Take 1 Tab by mouth every day.      Cyanocobalamin (VITAMIN B 12 PO) Take 1 Tab by mouth every day. (Patient not taking: Reported on 6/25/2025)      CALCIUM PO Take 1 Tab by mouth every day.      Glucosamine-Chondroitin (GLUCOSAMINE CHONDR COMPLEX PO) Take 1 Tab by mouth every day. (Patient not taking: Reported on 6/25/2025)       No current facility-administered medications on file prior to visit.   [2]   Allergies  Allergen Reactions    Codeine Nausea   [3]   Past Medical History:  Diagnosis Date    Bronchiectasis without complication (HCC) 12/11/2023    Complete tear of right rotator cuff 10/05/2017    High cholesterol     Hyperlipidemia     Pain     typical aches and pains with age    Snoring

## 2025-08-26 ENCOUNTER — APPOINTMENT (OUTPATIENT)
Dept: RADIOLOGY | Facility: MEDICAL CENTER | Age: 70
End: 2025-08-26
Attending: FAMILY MEDICINE
Payer: MEDICARE

## 2025-08-26 ASSESSMENT — FIBROSIS 4 INDEX: FIB4 SCORE: 1.71

## (undated) DEVICE — IMMOBILIZER, SHOULDER (UNIVERS)

## (undated) DEVICE — GLOVE BIOGEL SZ 7 SURGICAL PF LTX - (50PR/BX 4BX/CA)

## (undated) DEVICE — SUTURE 3-0 ETHILON FS-1 - (36/BX) 30 INCH

## (undated) DEVICE — SHAVER4.0 AGGRESSIVE + FORMLA (5EA/BX)

## (undated) DEVICE — DRAPE SHOULDER FLUID CONTROL - 77 X 85 (10/CA)

## (undated) DEVICE — DRESSING XEROFORM 1X8 - (50/BX 4BX/CA)

## (undated) DEVICE — LACTATED RINGERS INJ 1000 ML - (14EA/CA 60CA/PF)

## (undated) DEVICE — CHLORAPREP 26 ML APPLICATOR - ORANGE TINT(25/CA)

## (undated) DEVICE — HUMID-VENT HEAT AND MOISTURE EXCHANGE- (50/BX)

## (undated) DEVICE — PACK SHOULDER ARTHROSCOPY SM - (2EA/CA)

## (undated) DEVICE — DRESSING ABDOMINAL PAD STERILE 8 X 10" (360EA/CA)"

## (undated) DEVICE — MASK ANESTHESIA ADULT  - (100/CA)

## (undated) DEVICE — SODIUM CHL. IRRIGATION 0.9% 3000ML (4EA/CA 65CA/PF)

## (undated) DEVICE — GLOVE BIOGEL PI ULTRATOUCH SZ 7.5 SURGICAL PF LF -(50/BX 4BX/CA)

## (undated) DEVICE — DRAPE IOBAN II INCISE 23X17 - (10EA/BX 4BX/CA)

## (undated) DEVICE — NEEDLE W/FACET TIP DULL VERSION W/STIMULATION CABLE SONOPLEX 21G X 4 (10/EA)"

## (undated) DEVICE — GOWN WARMING STANDARD FLEX - (30/CA)

## (undated) DEVICE — HEAD HOLDER JUNIOR/ADULT

## (undated) DEVICE — NEPTUNE 4 PORT MANIFOLD - (20/PK)

## (undated) DEVICE — BLOCK

## (undated) DEVICE — KIT ROOM DECONTAMINATION

## (undated) DEVICE — DRAPE U SPLIT IMP 54 X 76 - (24/CA)

## (undated) DEVICE — WATER IRRIGATION STERILE 1000ML (12EA/CA)

## (undated) DEVICE — SPONGE GAUZE STER 4X4 8-PL - (2/PK 50PK/BX 12BX/CS)

## (undated) DEVICE — GLOVE BIOGEL PI ULTRATOUCH SZ 7.0 SURGICAL PF LF- POWDER FREE (50/BX 4BX/CA)

## (undated) DEVICE — KIT ANESTHESIA W/CIRCUIT & 3/LT BAG W/FILTER (20EA/CA)

## (undated) DEVICE — CANNULA THREADED 5X75 (5EA/BX)

## (undated) DEVICE — ELECTRODE 850 FOAM ADHESIVE - HYDROGEL RADIOTRNSPRNT (50/PK)

## (undated) DEVICE — GLOVE, LITE (PAIR)

## (undated) DEVICE — ELECTRODE DUAL RETURN W/ CORD - (50/PK)

## (undated) DEVICE — TOWELS CLOTH SURGICAL - (4/PK 20PK/CA)

## (undated) DEVICE — TUBE CONNECTING SUCTION - CLEAR PLASTIC STERILE 72 IN (50EA/CA)

## (undated) DEVICE — SLEEVE SHOULDER DISP(ARTHREX) - (6/BX)

## (undated) DEVICE — SUTURE GENERAL

## (undated) DEVICE — TUBE E-T HI-LO CUFF 7.5MM (10EA/PK)

## (undated) DEVICE — SHAVER, 5.5 RESECTOR

## (undated) DEVICE — GOWN SURGICAL X-LARGE ULTRA - FILM-REINFORCED (20/CA)

## (undated) DEVICE — BAG, SPONGE COUNT 50600

## (undated) DEVICE — CANNULA FULLY THREADED 8 X 75 (5EA/BX)

## (undated) DEVICE — PROTECTOR ULNA NERVE - (36PR/CA)

## (undated) DEVICE — SENSOR SPO2 NEO LNCS ADHESIVE (20/BX) SEE USER NOTES

## (undated) DEVICE — CANISTER SUCTION RIGID RED 1500CC (40EA/CA)

## (undated) DEVICE — SUCTION INSTRUMENT YANKAUER BULBOUS TIP W/O VENT (50EA/CA)

## (undated) DEVICE — ABLATOR WAND SERFAS 90-S CRUISE

## (undated) DEVICE — SODIUM CHL IRRIGATION 0.9% 1000ML (12EA/CA)

## (undated) DEVICE — GLOVE 7.0 LF PF PROTEXIS (50PR/BX)